# Patient Record
Sex: FEMALE | Race: WHITE | NOT HISPANIC OR LATINO | Employment: FULL TIME | URBAN - NONMETROPOLITAN AREA
[De-identification: names, ages, dates, MRNs, and addresses within clinical notes are randomized per-mention and may not be internally consistent; named-entity substitution may affect disease eponyms.]

---

## 2017-03-28 ENCOUNTER — OFFICE VISIT (OUTPATIENT)
Dept: ORTHOPEDIC SURGERY | Facility: CLINIC | Age: 62
End: 2017-03-28

## 2017-03-28 VITALS — WEIGHT: 179 LBS | HEIGHT: 66 IN | BODY MASS INDEX: 28.77 KG/M2

## 2017-03-28 DIAGNOSIS — Z87.81 HISTORY OF FRACTURE OF PATELLA: ICD-10-CM

## 2017-03-28 DIAGNOSIS — G89.29 CHRONIC PAIN OF LEFT KNEE: Primary | ICD-10-CM

## 2017-03-28 DIAGNOSIS — M25.562 CHRONIC PAIN OF LEFT KNEE: Primary | ICD-10-CM

## 2017-03-28 PROCEDURE — 99203 OFFICE O/P NEW LOW 30 MIN: CPT | Performed by: ORTHOPAEDIC SURGERY

## 2017-03-28 RX ORDER — LOVASTATIN 40 MG/1
40 TABLET ORAL NIGHTLY
COMMUNITY
Start: 2017-01-04 | End: 2018-01-05

## 2017-03-28 RX ORDER — TRAMADOL HYDROCHLORIDE 50 MG/1
TABLET ORAL
COMMUNITY
Start: 2017-01-27 | End: 2017-04-28

## 2017-03-28 RX ORDER — LEVOTHYROXINE SODIUM 88 UG/1
TABLET ORAL
COMMUNITY
Start: 2017-01-04 | End: 2017-11-01

## 2017-03-28 RX ORDER — ASPIRIN 81 MG/1
81 TABLET ORAL DAILY
COMMUNITY

## 2017-03-28 RX ORDER — FLUOXETINE HYDROCHLORIDE 40 MG/1
40 CAPSULE ORAL NIGHTLY
COMMUNITY
Start: 2017-01-04 | End: 2020-01-30 | Stop reason: SDUPTHER

## 2017-03-28 RX ORDER — MELOXICAM 15 MG/1
15 TABLET ORAL
COMMUNITY
Start: 2016-06-30 | End: 2020-01-09

## 2017-03-28 RX ORDER — FLUTICASONE PROPIONATE 50 MCG
2 SPRAY, SUSPENSION (ML) NASAL DAILY PRN
COMMUNITY
End: 2020-01-30 | Stop reason: SDUPTHER

## 2017-03-28 NOTE — PROGRESS NOTES
Neda Ignacio is a 62 y.o. female   Primary provider:  Prateek eBe MD       Chief Complaint   Patient presents with   • Left Knee - Establish Care, Pain   • Results     Patient brings xrays from        HISTORY OF PRESENT ILLNESS:    Pain   This is a chronic problem. The current episode started more than 1 year ago. The problem occurs constantly. The problem has been unchanged. Associated symptoms comments: Sharp pain with dull ache popping and grinding. The symptoms are aggravated by walking and standing (climbing stairs). She has tried NSAIDs and rest for the symptoms. The treatment provided mild relief.    she has been having a sharp pain with certain activities.  No specific injury.     CONCURRENT MEDICAL HISTORY:    Past Medical History:   Diagnosis Date   • Anxiety    • Arthritis    • Depression    • Tuberculosis        Allergies   Allergen Reactions   • Penicillins      stomach         Current Outpatient Prescriptions:   •  aspirin 81 MG EC tablet, Take 81 mg by mouth Daily., Disp: , Rfl:   •  FLUoxetine (PROzac) 40 MG capsule, Take 40 mg by mouth., Disp: , Rfl:   •  fluticasone (FLONASE) 50 MCG/ACT nasal spray, 2 sprays into each nostril Daily., Disp: , Rfl:   •  levothyroxine (SYNTHROID, LEVOTHROID) 88 MCG tablet, TAKE ONE TABLET BY MOUTH ONCE DAILY IN THE MORNING, Disp: , Rfl:   •  lovastatin (MEVACOR) 40 MG tablet, Take 40 mg by mouth., Disp: , Rfl:   •  meloxicam (MOBIC) 15 MG tablet, Take 15 mg by mouth., Disp: , Rfl:   •  traMADol (ULTRAM) 50 MG tablet, May take 1 to 2 tablets every 6 hours as needed for pain., Disp: , Rfl:     Past Surgical History:   Procedure Laterality Date   • HYSTERECTOMY         Family History   Problem Relation Age of Onset   • Heart disease Mother    • Arthritis Mother    • Heart disease Father    • Hypertension Father    • Arthritis Father    • Heart disease Sister    • Hypertension Sister    • Arthritis Sister    • Heart disease Brother    • Hypertension Brother    •  "Arthritis Brother        Social History     Social History   • Marital status:      Spouse name: N/A   • Number of children: N/A   • Years of education: N/A     Occupational History   • Not on file.     Social History Main Topics   • Smoking status: Never Smoker   • Smokeless tobacco: Never Used   • Alcohol use No   • Drug use: No   • Sexual activity: Not on file     Other Topics Concern   • Not on file     Social History Narrative   • No narrative on file        Review of Systems   Psychiatric/Behavioral: Positive for dysphoric mood. The patient is nervous/anxious.    All other systems reviewed and are negative.      PHYSICAL EXAMINATION:       Ht 66\" (167.6 cm)  Wt 179 lb (81.2 kg)  BMI 28.89 kg/m2    Physical Exam   Constitutional: She is oriented to person, place, and time. She appears well-developed and well-nourished.   Neurological: She is alert and oriented to person, place, and time.   Psychiatric: She has a normal mood and affect. Her behavior is normal. Judgment and thought content normal.       GAIT:     []  Normal  []  Antalgic    Assistive device: []  None  []  Walker     []  Crutches  []  Cane     []  Wheelchair  []  Stretcher    Right Knee Exam     Tenderness   The patient is experiencing no tenderness.         Range of Motion   The patient has normal right knee ROM.    Muscle Strength     The patient has normal right knee strength.    Tests   Drawer:       Anterior - negative      Varus: negative  Valgus: negative    Other   Erythema: absent  Sensation: normal  Pulse: present  Swelling: none      Left Knee Exam     Tenderness   Left knee tenderness location: Very tender on the lateral aspect at the level of the superior pole of the patella.    Range of Motion   The patient has normal left knee ROM.    Muscle Strength     The patient has normal left knee strength.    Tests   Drawer:       Anterior - negative       Varus: negative  Valgus: negative    Other   Erythema: absent  Sensation: " normal  Pulse: present  Swelling: none                    X-ray knee left 3 views1/25/2017  [facility]  Result Impression      Surgical change in the patella; small effusion.   Result Narrative   Indication:  Status post left knee surgery.    Left Knee, Three Views:  The knee is normally aligned and no fracture or acute abnormality.  There are surgical pins through the patella.  There is a wire around the patella that has two points of fracture that have developed since the July 2016 study.    The articular surface of the patella is smooth and well-preserved.  There is an effusion.           ASSESSMENT:    Diagnoses and all orders for this visit:    Chronic pain of left knee    History of fracture of patella    Other orders  -     FLUoxetine (PROzac) 40 MG capsule; Take 40 mg by mouth.  -     lovastatin (MEVACOR) 40 MG tablet; Take 40 mg by mouth.  -     traMADol (ULTRAM) 50 MG tablet; May take 1 to 2 tablets every 6 hours as needed for pain.  -     meloxicam (MOBIC) 15 MG tablet; Take 15 mg by mouth.  -     levothyroxine (SYNTHROID, LEVOTHROID) 88 MCG tablet; TAKE ONE TABLET BY MOUTH ONCE DAILY IN THE MORNING  -     aspirin 81 MG EC tablet; Take 81 mg by mouth Daily.  -     fluticasone (FLONASE) 50 MCG/ACT nasal spray; 2 sprays into each nostril Daily.          PLAN     We discussed proceeding with hardware removal of the left knee.  Patient has specific work requirements and does not think that she will be able to proceed with the full hardware removal until June or July.  We did discuss removal of hardware in the office with local anesthetic for the most prominent portion.  The patient was agreeable to that.  We will have her approved for that process in the office.    Return for For partial hardware removal..    Paul Biggs MD

## 2017-04-14 ENCOUNTER — TELEPHONE (OUTPATIENT)
Dept: ORTHOPEDIC SURGERY | Facility: CLINIC | Age: 62
End: 2017-04-14

## 2017-04-14 NOTE — TELEPHONE ENCOUNTER
Patient notified.     ----- Message from Paul Biggs MD sent at 4/13/2017  5:00 PM CDT -----  Really don't expect to much limitation after this.  It is a minimal procedure and hopefully she will be doing better than she is right now pretty quickly.    yassine  ----- Message -----     From: Ruba Randhawa MA     Sent: 4/12/2017  10:13 AM       To: Paul Biggs MD    She will have to have a  I know but i didn't know what other restrictions.   ----- Message -----     From: Jami Suarez     Sent: 4/12/2017  10:02 AM       To: [facility]    Dr. Biggs,    Patient is having a wire removed out of her left knee on 4/20/17 in the office. She wants to know if she will be ok to take care of her self and drive her self home afterwards.    Pt's # [phone]    -Jami

## 2017-04-20 ENCOUNTER — PROCEDURE VISIT (OUTPATIENT)
Dept: ORTHOPEDIC SURGERY | Facility: CLINIC | Age: 62
End: 2017-04-20

## 2017-04-20 VITALS — BODY MASS INDEX: 28.77 KG/M2 | WEIGHT: 179 LBS | HEIGHT: 66 IN

## 2017-04-20 DIAGNOSIS — Z87.81 HISTORY OF FRACTURE OF PATELLA: Primary | ICD-10-CM

## 2017-04-20 DIAGNOSIS — M25.562 CHRONIC PAIN OF LEFT KNEE: ICD-10-CM

## 2017-04-20 DIAGNOSIS — G89.29 CHRONIC PAIN OF LEFT KNEE: ICD-10-CM

## 2017-04-20 PROCEDURE — 20670 REMOVAL IMPLANT SUPERFICIAL: CPT | Performed by: ORTHOPAEDIC SURGERY

## 2017-04-20 NOTE — PROGRESS NOTES
"Neda Ignacio is a 62 y.o. female returns for     Chief Complaint   Patient presents with   • Left Knee - Follow-up   • Procedure     Hardware removal left knee       HISTORY OF PRESENT ILLNESS: Patient here for in office procedure. The patient voiced understanding of the risks, benefits, and alternative forms of treatment that were discussed and the patient consents to proceed with surgery.  All risks, benefits and alternatives were discussed.  Risks including to but not exclusive to anesthetic complications, including death, MI, CVA, infection, bleeding DVT, fracture, residual pain and need for future surgery.  This discussion was held with the patient by Paul Biggs MD and all questions were answered.         CONCURRENT MEDICAL HISTORY:    Past Medical History:   Diagnosis Date   • Anxiety    • Arthritis    • Depression    • Tuberculosis        Allergies   Allergen Reactions   • Penicillins      stomach         Current Outpatient Prescriptions:   •  aspirin 81 MG EC tablet, Take 81 mg by mouth Daily., Disp: , Rfl:   •  FLUoxetine (PROzac) 40 MG capsule, Take 40 mg by mouth., Disp: , Rfl:   •  fluticasone (FLONASE) 50 MCG/ACT nasal spray, 2 sprays into each nostril Daily., Disp: , Rfl:   •  levothyroxine (SYNTHROID, LEVOTHROID) 88 MCG tablet, TAKE ONE TABLET BY MOUTH ONCE DAILY IN THE MORNING, Disp: , Rfl:   •  lovastatin (MEVACOR) 40 MG tablet, Take 40 mg by mouth., Disp: , Rfl:   •  meloxicam (MOBIC) 15 MG tablet, Take 15 mg by mouth., Disp: , Rfl:   •  traMADol (ULTRAM) 50 MG tablet, May take 1 to 2 tablets every 6 hours as needed for pain., Disp: , Rfl:     Past Surgical History:   Procedure Laterality Date   • HYSTERECTOMY         ROS  No fevers or chills.  No chest pain or shortness of air.  No GI or  disturbances.    PHYSICAL EXAMINATION:       Ht 66\" (167.6 cm)  Wt 179 lb (81.2 kg)  BMI 28.89 kg/m2    Physical Exam    GAIT:     []  Normal  []  Antalgic    Assistive device: []  None  []  " Walker     []  Crutches  []  Cane     []  Wheelchair  []  Stretcher    Ortho Exam        Procedure:  The left knee was prepped and under aseptic conditions the lateral aspect of the left knee was injected with 2% lidocaine without epinephrine.  A small incision was made and dissection was carried down with a hemostat clamp.  The tip of the wire was palpable and was grasped with a needle .  The broken wire was removed with the wire knot without difficulty.  The wound was irrigated and then closed using interrupted nylon suture.  The wound was wrapped with 4 x 4's and an Ace bandage.          ASSESSMENT:    Diagnoses and all orders for this visit:    History of fracture of patella    Chronic pain of left knee          PLAN    Patient will keep the dressing in place for 24 hours and then can shower.  She will return in 10-14 days for suture removal.  Activity as tolerated.  No restrictions.    Return in about 10 days (around 4/30/2017) for Recheck.    Paul Biggs MD

## 2017-05-02 ENCOUNTER — OFFICE VISIT (OUTPATIENT)
Dept: ORTHOPEDIC SURGERY | Facility: CLINIC | Age: 62
End: 2017-05-02

## 2017-05-02 VITALS — HEIGHT: 66 IN | WEIGHT: 177 LBS | BODY MASS INDEX: 28.45 KG/M2

## 2017-05-02 DIAGNOSIS — M25.562 CHRONIC PAIN OF LEFT KNEE: ICD-10-CM

## 2017-05-02 DIAGNOSIS — G89.29 CHRONIC PAIN OF LEFT KNEE: ICD-10-CM

## 2017-05-02 DIAGNOSIS — Z87.81 HISTORY OF FRACTURE OF PATELLA: Primary | ICD-10-CM

## 2017-05-02 PROCEDURE — 99024 POSTOP FOLLOW-UP VISIT: CPT | Performed by: ORTHOPAEDIC SURGERY

## 2017-10-30 DIAGNOSIS — Z87.81 HISTORY OF FRACTURE OF PATELLA: Primary | ICD-10-CM

## 2017-10-31 ENCOUNTER — OFFICE VISIT (OUTPATIENT)
Dept: ORTHOPEDIC SURGERY | Facility: CLINIC | Age: 62
End: 2017-10-31

## 2017-10-31 VITALS — WEIGHT: 177 LBS | BODY MASS INDEX: 28.45 KG/M2 | HEIGHT: 66 IN

## 2017-10-31 DIAGNOSIS — M25.562 CHRONIC PAIN OF LEFT KNEE: ICD-10-CM

## 2017-10-31 DIAGNOSIS — G89.29 CHRONIC PAIN OF LEFT KNEE: ICD-10-CM

## 2017-10-31 DIAGNOSIS — Z87.81 HISTORY OF FRACTURE OF PATELLA: Primary | ICD-10-CM

## 2017-10-31 PROCEDURE — 99214 OFFICE O/P EST MOD 30 MIN: CPT | Performed by: ORTHOPAEDIC SURGERY

## 2017-10-31 RX ORDER — CLINDAMYCIN PHOSPHATE 600 MG/50ML
600 INJECTION INTRAVENOUS
Status: CANCELLED | OUTPATIENT
Start: 2017-11-06 | End: 2017-11-07

## 2017-10-31 NOTE — PROGRESS NOTES
"Neda Ignacio is a 62 y.o. female returns for     Chief Complaint   Patient presents with   • Left Knee - Follow-up       HISTORY OF PRESENT ILLNESS:   patient states wire started coming through about 5 days ago.   Having severe tenderness with pressure on the side of her knee.  No fevers or chills.  No new injury.     CONCURRENT MEDICAL HISTORY:    Past Medical History:   Diagnosis Date   • Anxiety    • Arthritis    • Depression    • Tuberculosis        Allergies   Allergen Reactions   • Penicillins      stomach         Current Outpatient Prescriptions:   •  aspirin 81 MG EC tablet, Take 81 mg by mouth Daily., Disp: , Rfl:   •  FLUoxetine (PROzac) 40 MG capsule, Take 40 mg by mouth., Disp: , Rfl:   •  levothyroxine (SYNTHROID, LEVOTHROID) 88 MCG tablet, TAKE ONE TABLET BY MOUTH ONCE DAILY IN THE MORNING, Disp: , Rfl:   •  lovastatin (MEVACOR) 40 MG tablet, Take 40 mg by mouth., Disp: , Rfl:   •  meloxicam (MOBIC) 15 MG tablet, Take 15 mg by mouth., Disp: , Rfl:   •  fluticasone (FLONASE) 50 MCG/ACT nasal spray, 2 sprays into each nostril Daily., Disp: , Rfl:     Past Surgical History:   Procedure Laterality Date   • HYSTERECTOMY         ROS  No fevers or chills.  No chest pain or shortness of air.  No GI or  disturbances.    PHYSICAL EXAMINATION:       Ht 66\" (167.6 cm)  Wt 177 lb (80.3 kg)  BMI 28.57 kg/m2    Physical Exam   Constitutional: She is oriented to person, place, and time. She appears well-developed and well-nourished. No distress.   Cardiovascular: Normal rate and regular rhythm.    Pulmonary/Chest: Effort normal and breath sounds normal.   Abdominal: Soft. Bowel sounds are normal.   Musculoskeletal:        Left knee: She exhibits no effusion.   Neurological: She is alert and oriented to person, place, and time.   Psychiatric: She has a normal mood and affect. Her behavior is normal. Judgment and thought content normal.   Vitals reviewed.      GAIT:     []  Normal  [x]  Antalgic    Assistive " device: [x]  None  []  Walker     []  Crutches  []  Cane     []  Wheelchair  []  Stretcher    Left Knee Exam     Tenderness   Left knee tenderness location: Extreme tenderness along the medial aspect of the knee with swelling and prominence on the medial side consistent with the presence of the underlying wire.    Range of Motion   The patient has normal left knee ROM.    Muscle Strength     The patient has normal left knee strength.    Tests   Drawer:       Anterior - negative       Varus: negative  Valgus: negative    Other   Erythema: absent  Scars: present  Sensation: normal  Pulse: present  Swelling: none  Effusion: no effusion present              Xr Knee 1 Or 2 View Left    Result Date: 10/31/2017  Narrative: AP and lateral of the left knee shows acceptable position and alignment of a patella fracture status post ORIF.  She has 2 cannulated screws from distal to proximal.  She also has a cerclage wire that is pointed medially with a soft tissue indentation noted on x-ray.  No other acute bony abnormalities are noted.  Mild arthritic change noted in the lateral compartment of the knee. 10/31/17 at 1:59 PM by Paul Biggs MD              ASSESSMENT:    Diagnoses and all orders for this visit:    History of fracture of patella  -     Case Request; Standing  -     clindamycin (CLEOCIN) 600 mg in dextrose (D5W) 5 % 100 mL IVPB; Infuse 600 mg into a venous catheter On Call to the Operating Room.  -     Case Request    Chronic pain of left knee  -     Case Request; Standing  -     clindamycin (CLEOCIN) 600 mg in dextrose (D5W) 5 % 100 mL IVPB; Infuse 600 mg into a venous catheter On Call to the Operating Room.  -     Case Request    Other orders  -     Obtain Informed Consent; Standing          PLAN    The patient voiced understanding of the risks, benefits, and alternative forms of treatment that were discussed and the patient consents to proceed with surgery.  All risks, benefits and alternatives were  discussed.  Risks including to but not exclusive to anesthetic complications, including death, MI, CVA, infection, bleeding DVT, fracture, residual pain and need for future surgery.  This discussion was held with the patient by Paul Biggs MD and all questions were answered.    Plan hardware removal:  2 screws and 2 wires.    Plan to be done in the operating room ASAP    Return for Post-operative eval.    Paul Biggs MD

## 2017-11-01 RX ORDER — RANITIDINE 150 MG/1
150 TABLET ORAL NIGHTLY
COMMUNITY
End: 2020-01-30 | Stop reason: SDUPTHER

## 2017-11-01 RX ORDER — LEVOTHYROXINE SODIUM 88 UG/1
88 TABLET ORAL DAILY
COMMUNITY
End: 2018-03-15 | Stop reason: SDUPTHER

## 2017-11-01 RX ORDER — DICYCLOMINE HCL 20 MG
20 TABLET ORAL EVERY 6 HOURS
COMMUNITY
End: 2020-01-30 | Stop reason: SDUPTHER

## 2017-11-03 ENCOUNTER — ANESTHESIA EVENT (OUTPATIENT)
Dept: PERIOP | Facility: HOSPITAL | Age: 62
End: 2017-11-03

## 2017-11-06 ENCOUNTER — APPOINTMENT (OUTPATIENT)
Dept: GENERAL RADIOLOGY | Facility: HOSPITAL | Age: 62
End: 2017-11-06

## 2017-11-06 ENCOUNTER — HOSPITAL ENCOUNTER (OUTPATIENT)
Facility: HOSPITAL | Age: 62
Setting detail: HOSPITAL OUTPATIENT SURGERY
Discharge: HOME | End: 2017-11-06
Attending: ORTHOPAEDIC SURGERY | Admitting: ORTHOPAEDIC SURGERY

## 2017-11-06 ENCOUNTER — ANESTHESIA (OUTPATIENT)
Dept: PERIOP | Facility: HOSPITAL | Age: 62
End: 2017-11-06

## 2017-11-06 VITALS
TEMPERATURE: 97.2 F | BODY MASS INDEX: 28.31 KG/M2 | OXYGEN SATURATION: 96 % | HEIGHT: 66 IN | DIASTOLIC BLOOD PRESSURE: 67 MMHG | WEIGHT: 176.15 LBS | SYSTOLIC BLOOD PRESSURE: 119 MMHG | HEART RATE: 77 BPM | RESPIRATION RATE: 18 BRPM

## 2017-11-06 DIAGNOSIS — M25.562 CHRONIC PAIN OF LEFT KNEE: ICD-10-CM

## 2017-11-06 DIAGNOSIS — G89.29 CHRONIC PAIN OF LEFT KNEE: ICD-10-CM

## 2017-11-06 DIAGNOSIS — Z87.81 HISTORY OF FRACTURE OF PATELLA: ICD-10-CM

## 2017-11-06 PROCEDURE — 20680 REMOVAL OF IMPLANT DEEP: CPT | Performed by: ORTHOPAEDIC SURGERY

## 2017-11-06 PROCEDURE — 73560 X-RAY EXAM OF KNEE 1 OR 2: CPT | Performed by: ORTHOPAEDIC SURGERY

## 2017-11-06 PROCEDURE — 25010000002 FENTANYL CITRATE (PF) 100 MCG/2ML SOLUTION: Performed by: NURSE ANESTHETIST, CERTIFIED REGISTERED

## 2017-11-06 PROCEDURE — 25010000002 MIDAZOLAM PER 1 MG: Performed by: NURSE ANESTHETIST, CERTIFIED REGISTERED

## 2017-11-06 PROCEDURE — 88300 SURGICAL PATH GROSS: CPT | Performed by: PATHOLOGY

## 2017-11-06 PROCEDURE — 76000 FLUOROSCOPY <1 HR PHYS/QHP: CPT

## 2017-11-06 PROCEDURE — 88300 SURGICAL PATH GROSS: CPT | Performed by: ORTHOPAEDIC SURGERY

## 2017-11-06 PROCEDURE — 25010000002 DEXAMETHASONE PER 1 MG: Performed by: NURSE ANESTHETIST, CERTIFIED REGISTERED

## 2017-11-06 PROCEDURE — 25010000002 ONDANSETRON PER 1 MG: Performed by: NURSE ANESTHETIST, CERTIFIED REGISTERED

## 2017-11-06 PROCEDURE — 25010000002 PROPOFOL 10 MG/ML EMULSION: Performed by: NURSE ANESTHETIST, CERTIFIED REGISTERED

## 2017-11-06 RX ORDER — EPHEDRINE SULFATE 50 MG/ML
5 INJECTION, SOLUTION INTRAVENOUS ONCE AS NEEDED
Status: DISCONTINUED | OUTPATIENT
Start: 2017-11-06 | End: 2017-11-06 | Stop reason: HOSPADM

## 2017-11-06 RX ORDER — DEXAMETHASONE SODIUM PHOSPHATE 4 MG/ML
INJECTION, SOLUTION INTRA-ARTICULAR; INTRALESIONAL; INTRAMUSCULAR; INTRAVENOUS; SOFT TISSUE AS NEEDED
Status: DISCONTINUED | OUTPATIENT
Start: 2017-11-06 | End: 2017-11-06 | Stop reason: SURG

## 2017-11-06 RX ORDER — FENTANYL CITRATE 50 UG/ML
INJECTION, SOLUTION INTRAMUSCULAR; INTRAVENOUS AS NEEDED
Status: DISCONTINUED | OUTPATIENT
Start: 2017-11-06 | End: 2017-11-06 | Stop reason: SURG

## 2017-11-06 RX ORDER — LABETALOL HYDROCHLORIDE 5 MG/ML
5 INJECTION, SOLUTION INTRAVENOUS
Status: DISCONTINUED | OUTPATIENT
Start: 2017-11-06 | End: 2017-11-06 | Stop reason: HOSPADM

## 2017-11-06 RX ORDER — ONDANSETRON 2 MG/ML
4 INJECTION INTRAMUSCULAR; INTRAVENOUS ONCE AS NEEDED
Status: DISCONTINUED | OUTPATIENT
Start: 2017-11-06 | End: 2017-11-06 | Stop reason: HOSPADM

## 2017-11-06 RX ORDER — FLUMAZENIL 0.1 MG/ML
0.2 INJECTION INTRAVENOUS AS NEEDED
Status: DISCONTINUED | OUTPATIENT
Start: 2017-11-06 | End: 2017-11-06 | Stop reason: HOSPADM

## 2017-11-06 RX ORDER — SODIUM CHLORIDE, SODIUM GLUCONATE, SODIUM ACETATE, POTASSIUM CHLORIDE, AND MAGNESIUM CHLORIDE 526; 502; 368; 37; 30 MG/100ML; MG/100ML; MG/100ML; MG/100ML; MG/100ML
1000 INJECTION, SOLUTION INTRAVENOUS CONTINUOUS PRN
Status: DISCONTINUED | OUTPATIENT
Start: 2017-11-06 | End: 2017-11-06 | Stop reason: HOSPADM

## 2017-11-06 RX ORDER — MIDAZOLAM HYDROCHLORIDE 1 MG/ML
INJECTION INTRAMUSCULAR; INTRAVENOUS AS NEEDED
Status: DISCONTINUED | OUTPATIENT
Start: 2017-11-06 | End: 2017-11-06 | Stop reason: SURG

## 2017-11-06 RX ORDER — HYDROCODONE BITARTRATE AND ACETAMINOPHEN 7.5; 325 MG/1; MG/1
1 TABLET ORAL EVERY 4 HOURS PRN
Qty: 20 TABLET | Refills: 0 | Status: SHIPPED | OUTPATIENT
Start: 2017-11-06 | End: 2017-12-19

## 2017-11-06 RX ORDER — EPHEDRINE SULFATE 50 MG/ML
INJECTION, SOLUTION INTRAVENOUS AS NEEDED
Status: DISCONTINUED | OUTPATIENT
Start: 2017-11-06 | End: 2017-11-06 | Stop reason: SURG

## 2017-11-06 RX ORDER — LIDOCAINE HYDROCHLORIDE 20 MG/ML
INJECTION, SOLUTION INFILTRATION; PERINEURAL AS NEEDED
Status: DISCONTINUED | OUTPATIENT
Start: 2017-11-06 | End: 2017-11-06 | Stop reason: SURG

## 2017-11-06 RX ORDER — NALOXONE HCL 0.4 MG/ML
0.2 VIAL (ML) INJECTION AS NEEDED
Status: DISCONTINUED | OUTPATIENT
Start: 2017-11-06 | End: 2017-11-06 | Stop reason: HOSPADM

## 2017-11-06 RX ORDER — CLINDAMYCIN PHOSPHATE 600 MG/50ML
600 INJECTION INTRAVENOUS
Status: COMPLETED | OUTPATIENT
Start: 2017-11-07 | End: 2017-11-06

## 2017-11-06 RX ORDER — ONDANSETRON 2 MG/ML
INJECTION INTRAMUSCULAR; INTRAVENOUS AS NEEDED
Status: DISCONTINUED | OUTPATIENT
Start: 2017-11-06 | End: 2017-11-06 | Stop reason: SURG

## 2017-11-06 RX ORDER — PROMETHAZINE HYDROCHLORIDE 12.5 MG/1
12.5 TABLET ORAL ONCE AS NEEDED
Status: DISCONTINUED | OUTPATIENT
Start: 2017-11-06 | End: 2017-11-06 | Stop reason: HOSPADM

## 2017-11-06 RX ORDER — PROPOFOL 10 MG/ML
VIAL (ML) INTRAVENOUS AS NEEDED
Status: DISCONTINUED | OUTPATIENT
Start: 2017-11-06 | End: 2017-11-06 | Stop reason: SURG

## 2017-11-06 RX ORDER — BACITRACIN 50000 [IU]/1
INJECTION, POWDER, FOR SOLUTION INTRAMUSCULAR AS NEEDED
Status: DISCONTINUED | OUTPATIENT
Start: 2017-11-06 | End: 2017-11-06 | Stop reason: HOSPADM

## 2017-11-06 RX ORDER — ONDANSETRON 4 MG/1
4 TABLET, FILM COATED ORAL ONCE AS NEEDED
Status: DISCONTINUED | OUTPATIENT
Start: 2017-11-06 | End: 2017-11-06 | Stop reason: HOSPADM

## 2017-11-06 RX ORDER — DIPHENHYDRAMINE HYDROCHLORIDE 50 MG/ML
12.5 INJECTION INTRAMUSCULAR; INTRAVENOUS
Status: DISCONTINUED | OUTPATIENT
Start: 2017-11-06 | End: 2017-11-06 | Stop reason: HOSPADM

## 2017-11-06 RX ORDER — HYDROCODONE BITARTRATE AND ACETAMINOPHEN 7.5; 325 MG/1; MG/1
1 TABLET ORAL ONCE AS NEEDED
Status: DISCONTINUED | OUTPATIENT
Start: 2017-11-06 | End: 2017-11-06 | Stop reason: HOSPADM

## 2017-11-06 RX ADMIN — SODIUM CHLORIDE, SODIUM GLUCONATE, SODIUM ACETATE, POTASSIUM CHLORIDE, AND MAGNESIUM CHLORIDE: 526; 502; 368; 37; 30 INJECTION, SOLUTION INTRAVENOUS at 16:43

## 2017-11-06 RX ADMIN — FENTANYL CITRATE 25 MCG: 50 INJECTION, SOLUTION INTRAMUSCULAR; INTRAVENOUS at 17:00

## 2017-11-06 RX ADMIN — MIDAZOLAM 2 MG: 1 INJECTION INTRAMUSCULAR; INTRAVENOUS at 16:43

## 2017-11-06 RX ADMIN — SODIUM CHLORIDE, SODIUM GLUCONATE, SODIUM ACETATE, POTASSIUM CHLORIDE, AND MAGNESIUM CHLORIDE 1000 ML: 526; 502; 368; 37; 30 INJECTION, SOLUTION INTRAVENOUS at 13:41

## 2017-11-06 RX ADMIN — CLINDAMYCIN IN 5 PERCENT DEXTROSE 600 MG: 12 INJECTION, SOLUTION INTRAVENOUS at 16:55

## 2017-11-06 RX ADMIN — ONDANSETRON 4 MG: 2 INJECTION INTRAMUSCULAR; INTRAVENOUS at 17:35

## 2017-11-06 RX ADMIN — EPHEDRINE SULFATE 10 MG: 50 INJECTION INTRAMUSCULAR; INTRAVENOUS; SUBCUTANEOUS at 17:10

## 2017-11-06 RX ADMIN — FENTANYL CITRATE 25 MCG: 50 INJECTION, SOLUTION INTRAMUSCULAR; INTRAVENOUS at 17:35

## 2017-11-06 RX ADMIN — PROPOFOL 150 MG: 10 INJECTION, EMULSION INTRAVENOUS at 16:52

## 2017-11-06 RX ADMIN — DEXAMETHASONE SODIUM PHOSPHATE 4 MG: 4 INJECTION, SOLUTION INTRAMUSCULAR; INTRAVENOUS at 17:35

## 2017-11-06 RX ADMIN — LIDOCAINE HYDROCHLORIDE 50 MG: 20 INJECTION, SOLUTION INFILTRATION; PERINEURAL at 16:52

## 2017-11-06 NOTE — ANESTHESIA PREPROCEDURE EVALUATION
Anesthesia Evaluation     NPO Solid Status: > 8 hours  NPO Liquid Status: > 8 hours     Airway   Mallampati: II  TM distance: >3 FB  Neck ROM: full  no difficulty expected  Dental    (+) poor dentition    Pulmonary     breath sounds clear to auscultation  (+) a smoker Former,   Cardiovascular     ECG reviewed  Rhythm: regular  Rate: normal        Neuro/Psych  (+) psychiatric history Depression,    GI/Hepatic/Renal/Endo    (+)  GERD well controlled, hypothyroidism,     Musculoskeletal     Abdominal     Abdomen: soft.   Substance History      OB/GYN          Other   (+) arthritis                                     Anesthesia Plan    ASA 2     general     intravenous induction   Anesthetic plan and risks discussed with patient.

## 2017-11-06 NOTE — OP NOTE
HARDWARE REMOVAL  Procedure Note    Name:    Neda Ignacio  YOB: 1955  Date of surgery:   11/6/2017    Pre-op Diagnosis:   History of fracture of patella [Z87.81]  Chronic pain of left knee [M25.562, G89.29]    Post-op Diagnosis:    Post-Op Diagnosis Codes:     * History of fracture of patella [Z87.81]     * Chronic pain of left knee [M25.562, G89.29]    Procedure:  Procedure(s):  HARDWARE REMOVAL LEFT KNEE                                                  (4-0 cannulated screws two and two free pieces of cerclage wire)    Surgeon:  Surgeon(s):  Paul Biggs MD    ASSISTANT:  Shirley Cook CSA    Anesthesia: General    Staff:   Circulator: Danita Mora RN; Alena Landa RN  Scrub Person: Liza Tsai V  Assistant: Shirley Cook CSA    Estimated Blood Loss: minimal    Specimens:                  ID Type Source Tests Collected by Time Destination   A :  Hardware / Foreign Body Knee, Left TISSUE EXAM Paul Biggs MD 11/6/2017 1712          Drains:           Findings:  As below    Complications: None    IMPLANTS:   Nothing was implanted during the procedure      PROCEDURE:  Once consent was obtained the patient was taken to the operating room and once adequate anesthesia was obtained in the left lower extremity was prepped and draped in the standard surgical fashion.  A surgical timeout was then performed and then the tourniquet was inflated to 300 mmHg.  A small incision was made overlying the prominence of the medial aspect of her knee and mild dissection was carried down to expose the wire that was prominent on the medial aspect of the knee.  An incision was then made on the inferior portion of the previous incision dissection was carried down to the extensor mechanism.  A mild amount of dissection was carried medially along the retinaculum until the other free piece of wire was encountered.  This was exposed and removed without difficulty.  Using C-arm imaging for  localization within the screw heads of the 2 screws that were going through the patella were identified and then engaged with the screwdriver and removed without difficulty.  2 longitudinal incisions were made in the patellar tendon in order to access the cannulated screw heads.  Once the screws were removed, the C-arm imaging was utilized to confirm that all hardware had been removed from the knee.  The wounds were then copiously irrigated bacitracin saline, the tourniquet was let down, hemostasis was obtained.  The wound was then closed using 2-0 Vicryl in the longitudinal splits of the tendon, 2-0 Vicryl subcutaneously, and 3-0 nylon for the skin.  The small medial wound was closed with nylon suture as well.  The wound was then covered with Xeroform gauze 4 x 4's and a bulky soft dressing was applied.  Patient was then awakened and taken to the recovery room in good condition, she tolerated the procedure very well.      Paul Biggs MD     Date: 11/6/2017  Time: 5:54 PM

## 2017-11-06 NOTE — ANESTHESIA POSTPROCEDURE EVALUATION
Patient: Neda Ignacio    Procedure Summary     Date Anesthesia Start Anesthesia Stop Room / Location    11/06/17 [id number]  [facility] OR 11 / [facility]       Procedure Diagnosis Surgeon Provider    HARDWARE REMOVAL LEFT KNEE                                                  (4-0 cannulated screws two and two free pieces of cerclage wire) (Left ) History of fracture of patella; Chronic pain of left knee  (History of fracture of patella [Z87.81]; Chronic pain of left knee [M25.562, G89.29]) MD Brock Morel MD          Anesthesia Type: general  Last vitals  BP   143/77 (11/06/17 1334)   Temp   97.5 °F (36.4 °C) (11/06/17 1334)   Pulse   65 (11/06/17 1334)   Resp   18 (11/06/17 1334)     SpO2   95 % (11/06/17 1334)     Post Anesthesia Care and Evaluation    Patient location during evaluation: PACU  Patient participation: complete - patient participated  Level of consciousness: sleepy but conscious  Pain management: adequate  Airway patency: patent  Anesthetic complications: No anesthetic complications    Cardiovascular status: acceptable  Respiratory status: acceptable  Hydration status: acceptable

## 2017-11-06 NOTE — ANESTHESIA PROCEDURE NOTES
Airway  Urgency: elective      General Information and Staff    Patient location during procedure: OR  CRNA: SHAWNA KEN    Indications and Patient Condition  Indications for airway management: airway protection    Preoxygenated: yes      Final Airway Details  Final airway type: supraglottic airway      Successful airway: classic  Size 4    Number of attempts at approach: 1

## 2017-11-07 LAB
LAB AP CASE REPORT: NORMAL
Lab: NORMAL
PATH REPORT.FINAL DX SPEC: NORMAL
PATH REPORT.GROSS SPEC: NORMAL

## 2017-11-07 NOTE — PLAN OF CARE
Problem: Patient Care Overview (Adult)  Goal: Plan of Care Review  Outcome: Ongoing (interventions implemented as appropriate)    11/06/17 6573   Coping/Psychosocial Response Interventions   Plan Of Care Reviewed With patient   Patient Care Overview   Progress improving   Outcome Evaluation   Outcome Summary/Follow up Plan vss, dc per anesthesia protocol, transfer to [facility].       Goal: Discharge Needs Assessment  Outcome: Ongoing (interventions implemented as appropriate)

## 2017-11-07 NOTE — DISCHARGE INSTR - APPOINTMENTS
CALL TOMORROW AND MAKE FOLLOW UP APPOINTMENT WITH DR GAYTAN, TORIE PARTT, OR MELODY HURST FOR 10-14 DAYS.

## 2017-11-07 NOTE — PLAN OF CARE
Problem: Patient Care Overview (Adult)  Goal: Plan of Care Review  Outcome: Outcome(s) achieved Date Met:  11/06/17  Goal: Adult Individualization and Mutuality  Outcome: Outcome(s) achieved Date Met:  11/06/17  Goal: Discharge Needs Assessment  Outcome: Outcome(s) achieved Date Met:  11/06/17    Problem: Perioperative Period (Adult)  Goal: Signs and Symptoms of Listed Potential Problems Will be Absent or Manageable (Perioperative Period)  Outcome: Outcome(s) achieved Date Met:  11/06/17

## 2017-11-21 ENCOUNTER — OFFICE VISIT (OUTPATIENT)
Dept: ORTHOPEDIC SURGERY | Facility: CLINIC | Age: 62
End: 2017-11-21

## 2017-11-21 VITALS — HEIGHT: 66 IN | WEIGHT: 176.4 LBS | BODY MASS INDEX: 28.35 KG/M2

## 2017-11-21 DIAGNOSIS — Z87.81 HISTORY OF FRACTURE OF PATELLA: ICD-10-CM

## 2017-11-21 DIAGNOSIS — M25.562 CHRONIC PAIN OF LEFT KNEE: Primary | ICD-10-CM

## 2017-11-21 DIAGNOSIS — G89.29 CHRONIC PAIN OF LEFT KNEE: Primary | ICD-10-CM

## 2017-11-21 PROCEDURE — 99024 POSTOP FOLLOW-UP VISIT: CPT | Performed by: NURSE PRACTITIONER

## 2017-11-21 NOTE — PROGRESS NOTES
Neda Ignacio is a 62 y.o. female is s/p       Chief Complaint   Patient presents with   • Left Knee - Post-op       HISTORY OF PRESENT ILLNESS: Patient presents to office for postop follow-up after having hardware (4-0 cannulated screws x2 and two free pieces of cerclage wire)  removed from the left knee on 11/6/2017 per Dr. Biggs.  Patient is doing well with no complaints or concerns noted.  Sutures are removed today and Steri-Strips applied.      Allergies   Allergen Reactions   • Penicillins GI Intolerance         Current Outpatient Prescriptions:   •  aspirin 81 MG EC tablet, Take 81 mg by mouth Daily., Disp: , Rfl:   •  dicyclomine (BENTYL) 20 MG tablet, Take 20 mg by mouth Every 6 (Six) Hours., Disp: , Rfl:   •  FLUoxetine (PROzac) 40 MG capsule, Take 40 mg by mouth Every Night., Disp: , Rfl:   •  fluticasone (FLONASE) 50 MCG/ACT nasal spray, 2 sprays into each nostril Daily As Needed., Disp: , Rfl:   •  HYDROcodone-acetaminophen (NORCO) 7.5-325 MG per tablet, Take 1 tablet by mouth Every 4 (Four) Hours As Needed for Moderate Pain  (Pain)., Disp: 20 tablet, Rfl: 0  •  levothyroxine (SYNTHROID, LEVOTHROID) 88 MCG tablet, Take 88 mcg by mouth Daily., Disp: , Rfl:   •  lovastatin (MEVACOR) 40 MG tablet, Take 40 mg by mouth Every Night., Disp: , Rfl:   •  meloxicam (MOBIC) 15 MG tablet, Take 15 mg by mouth., Disp: , Rfl:   •  raNITIdine (ZANTAC) 150 MG tablet, Take 150 mg by mouth Every Night., Disp: , Rfl:     No fevers or chills.  No nausea or vomiting.      PHYSICAL EXAMINATION:       Neda Ignacio is a 62 y.o. female    Patient is awake and alert, answers questions appropriately and is in no apparent distress.    GAIT:     [x]  Normal  []  Antalgic    Assistive device: [x]  None  []  Walker     []  Crutches  []  Cane     []  Wheelchair  []  Stretcher    Left Knee Exam     Tenderness   Left knee tenderness location: mild incisional tenderness anterior knee.    Range of Motion   The patient has normal left  knee ROM.    Muscle Strength     The patient has normal left knee strength.    Other   Erythema: absent  Sensation: normal  Pulse: present  Swelling: mild  Effusion: no effusion present    Comments:  Healing surgical incision to anterior knee that is well-approximated. No erythema or drainage noted. No signs of infection. Mild, localized swelling at the incision site.               ASSESSMENT:    Diagnoses and all orders for this visit:    Chronic pain of left knee    History of fracture of patella      PLAN    Incision to left knee is healing well postoperatively.  Sutures are removed today and Steri-Strips placed.  Patient is doing well and has no complaints or concerns.  Continue to monitor the wound for any signs of infection including increased redness, increased swelling, increased tenderness and/or purulent drainage.  Notify office if any of these signs are noted.  Follow-up in 4 weeks for recheck with Dr. Biggs.    Return in about 4 weeks (around 12/19/2017) for Recheck with Dr. Biggs.      This document has been electronically signed by BRITTANY Wheeler on November 21, 2017 5:11 PM      BRITTANY Wheeler

## 2017-12-19 ENCOUNTER — OFFICE VISIT (OUTPATIENT)
Dept: ORTHOPEDIC SURGERY | Facility: CLINIC | Age: 62
End: 2017-12-19

## 2017-12-19 DIAGNOSIS — M25.562 CHRONIC PAIN OF LEFT KNEE: Primary | ICD-10-CM

## 2017-12-19 DIAGNOSIS — Z87.81 HISTORY OF FRACTURE OF PATELLA: ICD-10-CM

## 2017-12-19 DIAGNOSIS — G89.29 CHRONIC PAIN OF LEFT KNEE: Primary | ICD-10-CM

## 2017-12-19 PROCEDURE — 99024 POSTOP FOLLOW-UP VISIT: CPT | Performed by: ORTHOPAEDIC SURGERY

## 2017-12-19 NOTE — PROGRESS NOTES
Neda Ignacio is a 62 y.o. female returns for     Chief Complaint   Patient presents with   • Left Knee - Follow-up       HISTORY OF PRESENT ILLNESS: HARDWARE REMOVAL LEFT KNEE done on 11/6/2017. Patient states that she is up on her feet all day she has pain and swelling.   But, she is doing better  Pain from before surgery is gone  No fevers or chills       CONCURRENT MEDICAL HISTORY:    Past Medical History:   Diagnosis Date   • Acid reflux    • Anxiety    • Arthritis    • Depression    • Disease of thyroid gland    • Tuberculosis        Allergies   Allergen Reactions   • Penicillins GI Intolerance         Current Outpatient Prescriptions:   •  aspirin 81 MG EC tablet, Take 81 mg by mouth Daily., Disp: , Rfl:   •  dicyclomine (BENTYL) 20 MG tablet, Take 20 mg by mouth Every 6 (Six) Hours., Disp: , Rfl:   •  FLUoxetine (PROzac) 40 MG capsule, Take 40 mg by mouth Every Night., Disp: , Rfl:   •  fluticasone (FLONASE) 50 MCG/ACT nasal spray, 2 sprays into each nostril Daily As Needed., Disp: , Rfl:   •  levothyroxine (SYNTHROID, LEVOTHROID) 88 MCG tablet, Take 88 mcg by mouth Daily., Disp: , Rfl:   •  lovastatin (MEVACOR) 40 MG tablet, Take 40 mg by mouth Every Night., Disp: , Rfl:   •  meloxicam (MOBIC) 15 MG tablet, Take 15 mg by mouth., Disp: , Rfl:   •  raNITIdine (ZANTAC) 150 MG tablet, Take 150 mg by mouth Every Night., Disp: , Rfl:     Past Surgical History:   Procedure Laterality Date   • HARDWARE REMOVAL Left 11/6/2017    Procedure: HARDWARE REMOVAL LEFT KNEE                                                  (4-0 cannulated screws two and two free pieces of cerclage wire);  Surgeon: Paul Biggs MD;  Location: [facility];  Service:    • HYSTERECTOMY     • KNEE SURGERY Left     hardware placed secondary to trauma       ROS  No fevers or chills.  No chest pain or shortness of air.  No GI or  disturbances.    PHYSICAL EXAMINATION:       There were no vitals taken for this visit.    Physical Exam    Constitutional: She is oriented to person, place, and time. She appears well-developed and well-nourished.   Musculoskeletal:        Right knee: She exhibits no effusion.   Neurological: She is alert and oriented to person, place, and time.   Psychiatric: She has a normal mood and affect. Her behavior is normal. Judgment and thought content normal.       GAIT:     [x]  Normal  []  Antalgic    Assistive device: [x]  None  []  Walker     []  Crutches  []  Cane     []  Wheelchair  []  Stretcher    Right Knee Exam     Tenderness   The patient is experiencing no tenderness.         Range of Motion   The patient has normal right knee ROM.    Tests   Varus: negative  Valgus: negative    Other   Erythema: absent  Scars: present  Swelling: mild  Other tests: no effusion present                      ASSESSMENT:    Diagnoses and all orders for this visit:    Chronic pain of left knee    History of fracture of patella          PLAN    Activity as tolerated  No restrictions  Continue to work on strength and conditioning  F/u as needed.    Return if symptoms worsen or fail to improve, for recheck.    Paul Biggs MD

## 2018-03-12 RX ORDER — DICYCLOMINE HCL 20 MG
20 TABLET ORAL EVERY 6 HOURS PRN
Qty: 30 TABLET | Refills: 3 | Status: CANCELLED | OUTPATIENT
Start: 2018-03-12

## 2018-03-13 RX ORDER — DICYCLOMINE HCL 20 MG
20 TABLET ORAL EVERY 6 HOURS PRN
Qty: 30 TABLET | Refills: 3 | Status: CANCELLED | OUTPATIENT
Start: 2018-03-12

## 2018-03-20 ENCOUNTER — OFFICE VISIT (OUTPATIENT)
Dept: PODIATRY | Facility: CLINIC | Age: 63
End: 2018-03-20

## 2018-03-20 VITALS — HEIGHT: 66 IN | OXYGEN SATURATION: 99 % | WEIGHT: 177.91 LBS | HEART RATE: 80 BPM | BODY MASS INDEX: 28.59 KG/M2

## 2018-03-20 DIAGNOSIS — S82.832A CLOSED FRACTURE OF DISTAL END OF LEFT FIBULA, UNSPECIFIED FRACTURE MORPHOLOGY, INITIAL ENCOUNTER: Primary | ICD-10-CM

## 2018-03-20 PROCEDURE — 27786 TREATMENT OF ANKLE FRACTURE: CPT | Performed by: PODIATRIST

## 2018-03-20 NOTE — PROGRESS NOTES
"Neda Ignacio  1955  63 y.o. female  PCP- Dr. Bee  Patient said on 03/15/2018 she \"rolled\" her ankle and did not catch herself. She was seen at Urgent Care on 03/15/2018 where x-rays where taken.  03/20/2018    Chief Complaint   Patient presents with   • Left Ankle - Ankle Injury           History of Present Illness    Neda Ignacio is a 63 y.o.female who presents to clinic today with chief complaint of left ankle injury.  States that on March 15, 2018 she rolled her left ankle and injured it.  Her ankle immediately swelled and she had difficulty bearing weight.  She went to the urgent care where x-rays were taken.  She was diagnosed with a ankle fracture placed into a splint and told to be nonweightbearing with crutches.  She presents today for follow-up.  She has been nonweightbearing to the left  lower extremity.  Rates her pain today as a 1 out of 10.  She has no other pedal complaints.        Past Medical History:   Diagnosis Date   • Acid reflux    • Anxiety    • Arthritis    • Depression    • Disease of thyroid gland    • Tuberculosis          Past Surgical History:   Procedure Laterality Date   • HARDWARE REMOVAL Left 11/6/2017    Procedure: HARDWARE REMOVAL LEFT KNEE                                                  (4-0 cannulated screws two and two free pieces of cerclage wire);  Surgeon: Paul Biggs MD;  Location: [facility];  Service:    • HYSTERECTOMY     • KNEE SURGERY Left     hardware placed secondary to trauma         Family History   Problem Relation Age of Onset   • Heart disease Mother    • Arthritis Mother    • Heart disease Father    • Hypertension Father    • Arthritis Father    • Heart disease Sister    • Hypertension Sister    • Arthritis Sister    • Heart disease Brother    • Hypertension Brother    • Arthritis Brother    • No Known Problems Maternal Grandmother    • No Known Problems Maternal Grandfather    • No Known Problems Paternal Grandmother    • No Known " "Problems Paternal Grandfather    • Autism Son    • No Known Problems Daughter        Allergies   Allergen Reactions   • Penicillins GI Intolerance       Social History     Social History   • Marital status:      Spouse name: N/A   • Number of children: N/A   • Years of education: N/A     Occupational History   • Not on file.     Social History Main Topics   • Smoking status: Former Smoker     Quit date: 2010   • Smokeless tobacco: Never Used   • Alcohol use No   • Drug use: No   • Sexual activity: Defer     Other Topics Concern   • Not on file     Social History Narrative   • No narrative on file         Current Outpatient Prescriptions   Medication Sig Dispense Refill   • aspirin 81 MG EC tablet Take 81 mg by mouth Daily.     • dicyclomine (BENTYL) 20 MG tablet Take 20 mg by mouth Every 6 (Six) Hours.     • FLUoxetine (PROzac) 40 MG capsule Take 40 mg by mouth Every Night.     • fluticasone (FLONASE) 50 MCG/ACT nasal spray 2 sprays into each nostril Daily As Needed.     • levothyroxine (SYNTHROID, LEVOTHROID) 88 MCG tablet Take 1 tablet by mouth Every Morning. 90 tablet 3   • lovastatin (MEVACOR) 40 MG tablet Take 1 tablet by mouth Every Night. 90 tablet 3   • meloxicam (MOBIC) 15 MG tablet Take 15 mg by mouth.     • raNITIdine (ZANTAC) 150 MG tablet Take 150 mg by mouth Every Night.       No current facility-administered medications for this visit.          OBJECTIVE    Pulse 80   Ht 168.9 cm (66.5\")   Wt 80.7 kg (177 lb 14.6 oz)   SpO2 99%   BMI 28.29 kg/m²       Review of Systems   Constitutional: Positive for activity change.   HENT: Negative.    Eyes: Negative.    Respiratory: Negative.    Cardiovascular: Negative.    Gastrointestinal: Negative.    Endocrine: Positive for cold intolerance.   Genitourinary: Negative.    Musculoskeletal: Positive for arthralgias.        Foot pain, ankle pain   Skin: Negative.    Allergic/Immunologic: Negative.    Neurological: Negative.    Hematological: Negative.  "   Psychiatric/Behavioral: Negative.            Physical Exam   Constitutional: She is oriented to person, place, and time. She appears well-developed and well-nourished.   HENT:   Head: Normocephalic and atraumatic.   Right Ear: External ear normal.   Left Ear: External ear normal.   Nose: Nose normal.   Eyes: Conjunctivae and EOM are normal. Pupils are equal, round, and reactive to light.   Pulmonary/Chest: Effort normal. No respiratory distress. She has no wheezes.   Neurological: She is alert and oriented to person, place, and time. She has normal reflexes.   Skin: She is not diaphoretic.   Psychiatric: She has a normal mood and affect. Her behavior is normal.   Vitals reviewed.      Left Lower Extremity    Cardiovascular:    DP/PT pulses palpable    CFT brisk  to all digits  Skin temp is warm to warm from proximal tibia to distal digits  Pedal hair growth present.   Edema and ecchymosis noted diffusely about the left lateral malleolus and hindfoot    Musculoskeletal:  muscle strength is deferred  Pain on palpation to the distal lateral malleolus.  No pain on palpation to the medial deltoid complex or medial malleolus.    Dermatological:   Webspaces 1-4 bilateral are clean, dry and intact.   No subcutaneous nodules or masses noted    No open wounds noted     Neurological:   Protective sensation intact    Sensation intact to light touch          Procedures        ASSESSMENT AND PLAN    Neda was seen today for ankle injury.    Diagnoses and all orders for this visit:    Closed fracture of distal end of left fibula, unspecified fracture morphology, initial encounter      - Comprehensive foot and ankle exam performed.   - X-rays reviewed taken in the urgent care.  Nondisplaced transverse distal fibular fracture noted.  - Dispensed cam boot  - Transition gradually to weightbearing as tolerated in cam boot  - Ice and elevate at home  - All questions were answered to the patients satisfaction.  - RTC 2 weeks, repeat  radiographs          This document has been electronically signed by Grzegorz Snell DPM on March 20, 2018 12:25 PM     3/20/2018  12:25 PM

## 2018-04-03 ENCOUNTER — OFFICE VISIT (OUTPATIENT)
Dept: PODIATRY | Facility: CLINIC | Age: 63
End: 2018-04-03

## 2018-04-03 VITALS — BODY MASS INDEX: 28.59 KG/M2 | WEIGHT: 177.91 LBS | HEIGHT: 66 IN

## 2018-04-03 DIAGNOSIS — S82.832D CLOSED FRACTURE OF DISTAL END OF LEFT FIBULA WITH ROUTINE HEALING, UNSPECIFIED FRACTURE MORPHOLOGY, SUBSEQUENT ENCOUNTER: Primary | ICD-10-CM

## 2018-04-03 PROCEDURE — 99024 POSTOP FOLLOW-UP VISIT: CPT | Performed by: PODIATRIST

## 2018-04-03 NOTE — PROGRESS NOTES
Neda Megha Higginsraymond  1955  63 y.o. female  PCP- Dr. Bee  Patient presents today for left ankle follow up.      Chief Complaint   Patient presents with   • Left Foot - Follow-up           History of Present Illness    Patient presents to clinic for follow-up of her left ankle fracture.  She is currently weightbearing in a cam boot.  She denies pain today.  She has no new pedal complaints.      Past Medical History:   Diagnosis Date   • Acid reflux    • Anxiety    • Arthritis    • Depression    • Disease of thyroid gland    • Tuberculosis          Past Surgical History:   Procedure Laterality Date   • HARDWARE REMOVAL Left 11/6/2017    Procedure: HARDWARE REMOVAL LEFT KNEE                                                  (4-0 cannulated screws two and two free pieces of cerclage wire);  Surgeon: Paul Biggs MD;  Location: [facility];  Service:    • HYSTERECTOMY     • KNEE SURGERY Left     hardware placed secondary to trauma         Family History   Problem Relation Age of Onset   • Heart disease Mother    • Arthritis Mother    • Heart disease Father    • Hypertension Father    • Arthritis Father    • Heart disease Sister    • Hypertension Sister    • Arthritis Sister    • Heart disease Brother    • Hypertension Brother    • Arthritis Brother    • No Known Problems Maternal Grandmother    • No Known Problems Maternal Grandfather    • No Known Problems Paternal Grandmother    • No Known Problems Paternal Grandfather    • Autism Son    • No Known Problems Daughter        Allergies   Allergen Reactions   • Penicillins GI Intolerance       Social History     Social History   • Marital status:      Spouse name: N/A   • Number of children: N/A   • Years of education: N/A     Occupational History   • Not on file.     Social History Main Topics   • Smoking status: Former Smoker     Quit date: 2010   • Smokeless tobacco: Never Used   • Alcohol use No   • Drug use: No   • Sexual activity: Defer     Other  "Topics Concern   • Not on file     Social History Narrative   • No narrative on file         Current Outpatient Prescriptions   Medication Sig Dispense Refill   • aspirin 81 MG EC tablet Take 81 mg by mouth Daily.     • dicyclomine (BENTYL) 20 MG tablet Take 20 mg by mouth Every 6 (Six) Hours.     • FLUoxetine (PROzac) 40 MG capsule Take 40 mg by mouth Every Night.     • FLUoxetine (PROzac) 40 MG capsule Take 1 capsule by mouth Daily. 90 capsule 3   • fluticasone (FLONASE) 50 MCG/ACT nasal spray 2 sprays into each nostril Daily As Needed.     • levothyroxine (SYNTHROID, LEVOTHROID) 88 MCG tablet Take 1 tablet by mouth Every Morning. 90 tablet 3   • lovastatin (MEVACOR) 40 MG tablet Take 1 tablet by mouth Every Night. 90 tablet 3   • meloxicam (MOBIC) 15 MG tablet Take 15 mg by mouth.     • meloxicam (MOBIC) 15 MG tablet Take 1 tablet by mouth Daily with food. 90 tablet 3   • raNITIdine (ZANTAC) 150 MG tablet Take 150 mg by mouth Every Night.       No current facility-administered medications for this visit.          OBJECTIVE    Ht 168.9 cm (66.5\")   Wt 80.7 kg (177 lb 14.6 oz)   BMI 28.29 kg/m²       Review of Systems   Constitutional: Positive for activity change. Negative for chills and fever.   Respiratory: Negative for cough and shortness of breath.    Cardiovascular: Negative for chest pain and leg swelling.   Gastrointestinal: Negative for diarrhea, nausea and vomiting.   Musculoskeletal:        Foot pain, ankle pain   Skin: Negative.    Psychiatric/Behavioral: Negative.            Physical Exam   Constitutional: She is oriented to person, place, and time. She appears well-developed and well-nourished. No distress.   HENT:   Head: Normocephalic and atraumatic.   Nose: Nose normal.   Pulmonary/Chest: Effort normal. No respiratory distress. She has no wheezes.   Neurological: She is alert and oriented to person, place, and time.   Psychiatric: She has a normal mood and affect. Her behavior is normal. "       Left Lower Extremity    Cardiovascular:    DP/PT pulses palpable    Improved edema and ecchymosis noted    Musculoskeletal:  muscle strength is deferred  Improved Pain on palpation to the distal lateral malleolus.    Dermatological:   Webspaces 1-4 bilateral are clean, dry and intact.   No subcutaneous nodules or masses noted    No open wounds noted     Neurological:   Protective sensation intact    Sensation intact to light touch          Procedures        ASSESSMENT AND PLAN    Neda was seen today for follow-up.    Diagnoses and all orders for this visit:    Closed fracture of distal end of left fibula with routine healing, unspecified fracture morphology, subsequent encounter  -     XR Ankle 3+ View Left      - Continue weightbearing as tolerated in Cam Walker  - Ice and elevate at home  - All questions were answered to the patients satisfaction.  - RTC 2 weeks          This document has been electronically signed by Grzegorz Snell DPM on April 3, 2018 3:34 PM     4/3/2018  3:34 PM

## 2018-04-17 ENCOUNTER — OFFICE VISIT (OUTPATIENT)
Dept: PODIATRY | Facility: CLINIC | Age: 63
End: 2018-04-17

## 2018-04-17 VITALS — BODY MASS INDEX: 27.92 KG/M2 | HEIGHT: 67 IN | WEIGHT: 177.91 LBS

## 2018-04-17 DIAGNOSIS — S82.832D CLOSED FRACTURE OF DISTAL END OF LEFT FIBULA WITH ROUTINE HEALING, UNSPECIFIED FRACTURE MORPHOLOGY, SUBSEQUENT ENCOUNTER: Primary | ICD-10-CM

## 2018-04-17 PROCEDURE — 99024 POSTOP FOLLOW-UP VISIT: CPT | Performed by: PODIATRIST

## 2018-04-17 NOTE — PROGRESS NOTES
Neda Meghapascual Ignacio  1955  63 y.o. female  PCP- Dr. Bee  Patient presents today for left ankle follow up.      Chief Complaint   Patient presents with   • Left Foot - Follow-up           History of Present Illness    Patient presents to clinic for follow-up of her left ankle fracture.  She is currently weightbearing in regular shoe gear today.  She denies pain today.  She has no new pedal complaints.      Past Medical History:   Diagnosis Date   • Acid reflux    • Anxiety    • Arthritis    • Depression    • Disease of thyroid gland    • Tuberculosis          Past Surgical History:   Procedure Laterality Date   • HARDWARE REMOVAL Left 11/6/2017    Procedure: HARDWARE REMOVAL LEFT KNEE                                                  (4-0 cannulated screws two and two free pieces of cerclage wire);  Surgeon: Paul Biggs MD;  Location: [facility];  Service:    • HYSTERECTOMY     • KNEE SURGERY Left     hardware placed secondary to trauma         Family History   Problem Relation Age of Onset   • Heart disease Mother    • Arthritis Mother    • Heart disease Father    • Hypertension Father    • Arthritis Father    • Heart disease Sister    • Hypertension Sister    • Arthritis Sister    • Heart disease Brother    • Hypertension Brother    • Arthritis Brother    • No Known Problems Maternal Grandmother    • No Known Problems Maternal Grandfather    • No Known Problems Paternal Grandmother    • No Known Problems Paternal Grandfather    • Autism Son    • No Known Problems Daughter        Allergies   Allergen Reactions   • Penicillins GI Intolerance       Social History     Social History   • Marital status:      Spouse name: N/A   • Number of children: N/A   • Years of education: N/A     Occupational History   • Not on file.     Social History Main Topics   • Smoking status: Former Smoker     Quit date: 2010   • Smokeless tobacco: Never Used   • Alcohol use No   • Drug use: No   • Sexual activity: Defer  "    Other Topics Concern   • Not on file     Social History Narrative   • No narrative on file         Current Outpatient Prescriptions   Medication Sig Dispense Refill   • aspirin 81 MG EC tablet Take 81 mg by mouth Daily.     • dicyclomine (BENTYL) 20 MG tablet Take 20 mg by mouth Every 6 (Six) Hours.     • FLUoxetine (PROzac) 40 MG capsule Take 40 mg by mouth Every Night.     • FLUoxetine (PROzac) 40 MG capsule Take 1 capsule by mouth Daily. 90 capsule 3   • fluticasone (FLONASE) 50 MCG/ACT nasal spray 2 sprays into each nostril Daily As Needed.     • levothyroxine (SYNTHROID, LEVOTHROID) 88 MCG tablet Take 1 tablet by mouth Every Morning. 90 tablet 3   • lovastatin (MEVACOR) 40 MG tablet Take 1 tablet by mouth Every Night. 90 tablet 3   • meloxicam (MOBIC) 15 MG tablet Take 15 mg by mouth.     • meloxicam (MOBIC) 15 MG tablet Take 1 tablet by mouth Daily with food. 90 tablet 3   • raNITIdine (ZANTAC) 150 MG tablet Take 150 mg by mouth Every Night.       No current facility-administered medications for this visit.          OBJECTIVE    Ht 168.9 cm (66.5\")   Wt 80.7 kg (177 lb 14.6 oz)   BMI 28.29 kg/m²       Review of Systems   Constitutional: Positive for activity change. Negative for chills and fever.   Respiratory: Negative for cough and shortness of breath.    Cardiovascular: Negative for chest pain and leg swelling.   Gastrointestinal: Negative for diarrhea, nausea and vomiting.   Musculoskeletal:        Foot pain, ankle pain   Skin: Negative.    Psychiatric/Behavioral: Negative.            Physical Exam   Constitutional: She is oriented to person, place, and time. She appears well-developed and well-nourished. No distress.   HENT:   Head: Normocephalic and atraumatic.   Nose: Nose normal.   Pulmonary/Chest: Effort normal. No respiratory distress. She has no wheezes.   Neurological: She is alert and oriented to person, place, and time.   Psychiatric: She has a normal mood and affect. Her behavior is " normal.       Left Lower Extremity    Cardiovascular:    DP/PT pulses palpable    No edema noted    Musculoskeletal:  muscle strength is deferred  No Pain on palpation to the distal lateral malleolus.    Dermatological:   Webspaces 1-4 bilateral are clean, dry and intact.   No subcutaneous nodules or masses noted    No open wounds noted     Neurological:   Protective sensation intact    Sensation intact to light touch          Procedures        ASSESSMENT AND PLAN    Neda was seen today for follow-up.    Diagnoses and all orders for this visit:    Closed fracture of distal end of left fibula with routine healing, unspecified fracture morphology, subsequent encounter  -     XR Ankle 3+ View Left      - dispensed lace up ankle brace.   - Ice and elevate at home as needed  - All questions were answered to the patients satisfaction.  - RTC 4 weeks          This document has been electronically signed by Grzegorz Snell DPM on April 17, 2018 10:33 AM     4/17/2018  10:33 AM

## 2018-05-15 ENCOUNTER — OFFICE VISIT (OUTPATIENT)
Dept: PODIATRY | Facility: CLINIC | Age: 63
End: 2018-05-15

## 2018-05-15 VITALS — BODY MASS INDEX: 27.92 KG/M2 | HEIGHT: 67 IN | WEIGHT: 177.91 LBS | OXYGEN SATURATION: 98 % | HEART RATE: 82 BPM

## 2018-05-15 DIAGNOSIS — S82.832D CLOSED FRACTURE OF DISTAL END OF LEFT FIBULA WITH ROUTINE HEALING, UNSPECIFIED FRACTURE MORPHOLOGY, SUBSEQUENT ENCOUNTER: Primary | ICD-10-CM

## 2018-05-15 PROCEDURE — 99024 POSTOP FOLLOW-UP VISIT: CPT | Performed by: PODIATRIST

## 2018-05-15 NOTE — PROGRESS NOTES
Neda Megha Mateus  1955  63 y.o. female  PCP- Dr. Bee  Patient presents today for left ankle follow up.  Patient rates her pain 3/10.      Chief Complaint   Patient presents with   • Left Ankle - Follow-up     History of Present Illness    Patient presents to clinic for follow-up of her left ankle fracture.   She denies pain today.  She is in regular shoe gear and back to work. No new pedal complaints.       Past Medical History:   Diagnosis Date   • Acid reflux    • Anxiety    • Arthritis    • Depression    • Disease of thyroid gland    • Tuberculosis          Past Surgical History:   Procedure Laterality Date   • HARDWARE REMOVAL Left 11/6/2017    Procedure: HARDWARE REMOVAL LEFT KNEE                                                  (4-0 cannulated screws two and two free pieces of cerclage wire);  Surgeon: Paul Biggs MD;  Location: [facility];  Service:    • HYSTERECTOMY     • KNEE SURGERY Left     hardware placed secondary to trauma         Family History   Problem Relation Age of Onset   • Heart disease Mother    • Arthritis Mother    • Heart disease Father    • Hypertension Father    • Arthritis Father    • Heart disease Sister    • Hypertension Sister    • Arthritis Sister    • Heart disease Brother    • Hypertension Brother    • Arthritis Brother    • No Known Problems Maternal Grandmother    • No Known Problems Maternal Grandfather    • No Known Problems Paternal Grandmother    • No Known Problems Paternal Grandfather    • Autism Son    • No Known Problems Daughter        Allergies   Allergen Reactions   • Penicillins GI Intolerance       Social History     Social History   • Marital status:      Spouse name: N/A   • Number of children: N/A   • Years of education: N/A     Occupational History   • Not on file.     Social History Main Topics   • Smoking status: Former Smoker     Quit date: 2010   • Smokeless tobacco: Never Used   • Alcohol use No   • Drug use: No   • Sexual activity:  "Defer     Other Topics Concern   • Not on file     Social History Narrative   • No narrative on file         Current Outpatient Prescriptions   Medication Sig Dispense Refill   • aspirin 81 MG EC tablet Take 81 mg by mouth Daily.     • dicyclomine (BENTYL) 20 MG tablet Take 20 mg by mouth Every 6 (Six) Hours.     • FLUoxetine (PROzac) 40 MG capsule Take 40 mg by mouth Every Night.     • FLUoxetine (PROzac) 40 MG capsule Take 1 capsule by mouth Daily. 90 capsule 3   • fluticasone (FLONASE) 50 MCG/ACT nasal spray 2 sprays into each nostril Daily As Needed.     • levothyroxine (SYNTHROID, LEVOTHROID) 88 MCG tablet Take 1 tablet by mouth Every Morning. 90 tablet 3   • lovastatin (MEVACOR) 40 MG tablet Take 1 tablet by mouth Every Night. 90 tablet 3   • meloxicam (MOBIC) 15 MG tablet Take 15 mg by mouth.     • meloxicam (MOBIC) 15 MG tablet Take 1 tablet by mouth Daily with food. 90 tablet 3   • raNITIdine (ZANTAC) 150 MG tablet Take 150 mg by mouth Every Night.       No current facility-administered medications for this visit.          OBJECTIVE    Pulse 82   Ht 168.9 cm (66.5\")   Wt 80.7 kg (177 lb 14.6 oz)   SpO2 98%   BMI 28.29 kg/m²       Review of Systems   Constitutional: Positive for activity change. Negative for chills and fever.   Respiratory: Negative for cough and shortness of breath.    Cardiovascular: Negative for chest pain and leg swelling.   Gastrointestinal: Negative for diarrhea, nausea and vomiting.   Musculoskeletal:        Foot pain, ankle pain   Skin: Negative.    Psychiatric/Behavioral: Negative.            Physical Exam   Constitutional: She is oriented to person, place, and time. She appears well-developed and well-nourished. No distress.   HENT:   Head: Normocephalic and atraumatic.   Nose: Nose normal.   Pulmonary/Chest: Effort normal. No respiratory distress. She has no wheezes.   Neurological: She is alert and oriented to person, place, and time.   Psychiatric: She has a normal mood and " affect. Her behavior is normal.       Left Lower Extremity    Cardiovascular:    DP/PT pulses palpable    No edema noted    Musculoskeletal:  muscle strength is deferred  No pain on palpation to the distal lateral malleolus.    Dermatological:   Webspaces 1-4 bilateral are clean, dry and intact.   No subcutaneous nodules or masses noted    No open wounds noted     Neurological:   Protective sensation intact    Sensation intact to light touch          Procedures        ASSESSMENT AND PLAN    Neda was seen today for follow-up.    Diagnoses and all orders for this visit:    Closed fracture of distal end of left fibula with routine healing, unspecified fracture morphology, subsequent encounter  -     XR Ankle 3+ View Left      - X-rays taken and reviewed.  Healing lateral malleolus fracture noted.  - Patient is discharged from care at this time.  - All questions were answered to the patients satisfaction.  - RTC as needed          This document has been electronically signed by Grzegorz Snell DPM on May 15, 2018 12:56 PM     5/15/2018  12:56 PM

## 2018-06-08 RX ORDER — LOVASTATIN 40 MG/1
40 TABLET ORAL NIGHTLY
Qty: 90 TABLET | Refills: 3 | Status: CANCELLED | OUTPATIENT
Start: 2018-06-08

## 2018-06-08 RX ORDER — LEVOTHYROXINE SODIUM 88 UG/1
88 TABLET ORAL EVERY MORNING
Qty: 90 TABLET | Refills: 3 | Status: CANCELLED | OUTPATIENT
Start: 2018-06-08

## 2018-07-11 RX ORDER — FLUOXETINE HYDROCHLORIDE 40 MG/1
40 CAPSULE ORAL DAILY
Qty: 90 CAPSULE | Refills: 3 | Status: CANCELLED | OUTPATIENT
Start: 2018-07-11

## 2018-07-11 RX ORDER — MELOXICAM 15 MG/1
15 TABLET ORAL DAILY
Qty: 90 TABLET | Refills: 3 | Status: CANCELLED | OUTPATIENT
Start: 2018-07-11

## 2020-01-09 ENCOUNTER — APPOINTMENT (OUTPATIENT)
Dept: GENERAL RADIOLOGY | Facility: HOSPITAL | Age: 65
End: 2020-01-09

## 2020-01-09 ENCOUNTER — HOSPITAL ENCOUNTER (EMERGENCY)
Facility: HOSPITAL | Age: 65
Discharge: HOME | End: 2020-01-09
Attending: EMERGENCY MEDICINE | Admitting: EMERGENCY MEDICINE

## 2020-01-09 VITALS
WEIGHT: 179.4 LBS | RESPIRATION RATE: 18 BRPM | BODY MASS INDEX: 28.16 KG/M2 | HEART RATE: 94 BPM | TEMPERATURE: 98.2 F | SYSTOLIC BLOOD PRESSURE: 153 MMHG | DIASTOLIC BLOOD PRESSURE: 97 MMHG | HEIGHT: 67 IN | OXYGEN SATURATION: 96 %

## 2020-01-09 DIAGNOSIS — M15.3 SECONDARY MULTIPLE ARTHRITIS: ICD-10-CM

## 2020-01-09 DIAGNOSIS — M25.562 ACUTE PAIN OF LEFT KNEE: Primary | ICD-10-CM

## 2020-01-09 PROCEDURE — 99283 EMERGENCY DEPT VISIT LOW MDM: CPT

## 2020-01-09 PROCEDURE — 73564 X-RAY EXAM KNEE 4 OR MORE: CPT

## 2020-01-09 RX ORDER — IBUPROFEN 400 MG/1
400 TABLET ORAL 2 TIMES DAILY WITH MEALS
Qty: 28 TABLET | Refills: 0 | Status: SHIPPED | OUTPATIENT
Start: 2020-01-09 | End: 2020-01-23

## 2020-01-09 RX ORDER — IBUPROFEN 400 MG/1
400 TABLET ORAL ONCE
Status: COMPLETED | OUTPATIENT
Start: 2020-01-09 | End: 2020-01-09

## 2020-01-09 RX ORDER — ACETAMINOPHEN 325 MG/1
650 TABLET ORAL EVERY 6 HOURS PRN
COMMUNITY

## 2020-01-09 RX ORDER — IBUPROFEN 400 MG/1
400 TABLET ORAL 2 TIMES DAILY WITH MEALS
Qty: 180 TABLET | Refills: 0 | Status: SHIPPED | OUTPATIENT
Start: 2020-01-09 | End: 2020-01-09 | Stop reason: SDUPTHER

## 2020-01-09 RX ADMIN — IBUPROFEN 400 MG: 400 TABLET, FILM COATED ORAL at 18:49

## 2020-01-09 NOTE — ED NOTES
Pt presents to the ED with c/o left knee pain. Pt reports she heard a loud pop and now has pain with ambulation. Pt denies pain while sitting. Pt reports she had surgery on her knee 4 years ago.      Beatriz Garcia RN  01/09/20 [id number]

## 2020-01-09 NOTE — ED PROVIDER NOTES
"Subjective   Patient presents to emergency department for left knee pain following an injury today prior to arrival.  She works in  here in the hospital.  Endorses previous surgery to left knee from trauma and had hardware removal by Dr. Biggs.  States she is able to bear weight with normal pain but that her left knee feels unstable.  Denies numbness tingling weakness.      History provided by:  Patient   used: No    Knee Pain   Location:  Knee  Time since incident:  1 hour  Injury: yes    Knee location:  L knee  Pain details:     Quality:  Aching    Radiates to:  Does not radiate    Severity:  Mild    Timing:  Constant  Chronicity:  New  Associated symptoms: no decreased ROM, no fever, no numbness and no tingling    Associated symptoms comment:  States her left knee feels unstable \"like something released and it's going to give out\".      Review of Systems   Constitutional: Negative for chills and fever.   Eyes: Negative for visual disturbance.   Respiratory: Negative for shortness of breath.    Cardiovascular: Negative for chest pain.   Gastrointestinal: Negative for abdominal pain.   Musculoskeletal: Positive for arthralgias and joint swelling.   Skin: Negative for color change.   Neurological: Negative for weakness.       Past Medical History:   Diagnosis Date   • Acid reflux    • Anxiety    • Arthritis    • Depression    • Disease of thyroid gland    • Tuberculosis        Allergies   Allergen Reactions   • Penicillins GI Intolerance       Past Surgical History:   Procedure Laterality Date   • HARDWARE REMOVAL Left 11/6/2017    Procedure: HARDWARE REMOVAL LEFT KNEE                                                  (4-0 cannulated screws two and two free pieces of cerclage wire);  Surgeon: Paul Biggs MD;  Location: [facility];  Service:    • HYSTERECTOMY     • KNEE SURGERY Left     hardware placed secondary to trauma       Family History   Problem Relation Age of Onset " "  • Heart disease Mother    • Arthritis Mother    • Heart disease Father    • Hypertension Father    • Arthritis Father    • Heart disease Sister    • Hypertension Sister    • Arthritis Sister    • Heart disease Brother    • Hypertension Brother    • Arthritis Brother    • No Known Problems Maternal Grandmother    • No Known Problems Maternal Grandfather    • No Known Problems Paternal Grandmother    • No Known Problems Paternal Grandfather    • Autism Son    • No Known Problems Daughter        Social History     Socioeconomic History   • Marital status:      Spouse name: Not on file   • Number of children: Not on file   • Years of education: Not on file   • Highest education level: Not on file   Tobacco Use   • Smoking status: Former Smoker     Last attempt to quit: 2010     Years since quitting: 10.0   • Smokeless tobacco: Never Used   Substance and Sexual Activity   • Alcohol use: No   • Drug use: No   • Sexual activity: Defer           Objective      /97 (BP Location: Right arm, Patient Position: Sitting)   Pulse 94   Temp 98.2 °F (36.8 °C) (Oral)   Resp 18   Ht 168.9 cm (66.5\")   Wt 81.4 kg (179 lb 6.4 oz)   SpO2 96%   BMI 28.52 kg/m²     Physical Exam   Constitutional: She appears well-developed and well-nourished. No distress.   HENT:   Head: Normocephalic and atraumatic.   Eyes: Conjunctivae are normal.   Cardiovascular: Normal rate, regular rhythm, normal heart sounds and intact distal pulses.   Pulmonary/Chest: Effort normal and breath sounds normal. No respiratory distress. She has no wheezes.   Musculoskeletal: Normal range of motion. She exhibits tenderness (posterior (L) knee). She exhibits no edema or deformity.   Neurological: She is alert.   Skin: Skin is warm. Capillary refill takes less than 2 seconds.   Psychiatric: She has a normal mood and affect. Her behavior is normal. Thought content normal.   Nursing note and vitals reviewed.      Procedures           ED Course      Xr " Knee 4+ View Left    Result Date: 1/9/2020  Narrative: Four view left knee HISTORY: Pain after twisting knee. Left knee feels unstable. AP, lateral, tunnel and oblique views obtained. COMPARISON: October 31, 2017 FINDINGS: No acute fracture or dislocation. Healed patellar fracture. Screws and cerclage wires have been removed. Small patellar, femoral and tibial spurs. Minimal narrowing of the medial joint space. Very small suprapatellar effusion. No other osseous or articular abnormality.     Impression: CONCLUSION: No acute fracture or dislocation. Healed patellar fracture. Screws and cerclage wires have been removed. Small patellar, femoral and tibial spurs. Minimal narrowing of the medial joint space. Very small suprapatellar effusion. [id number] Electronically signed by:  Barrie Mi MD  1/9/2020 6:11 PM CST Workstation: [id number]         Discussed results with patient.  Gave educational materials.  Advised close follow up with Orthopedics.  Return to emergency department for new or worsening symptoms.                                        [facility]    Final diagnoses:   Acute pain of left knee            West Marrufo PA-C  01/09/20 1833

## 2020-01-10 DIAGNOSIS — M25.562 LEFT KNEE PAIN, UNSPECIFIED CHRONICITY: Primary | ICD-10-CM

## 2020-01-13 ENCOUNTER — TRANSCRIBE ORDERS (OUTPATIENT)
Dept: PHYSICAL THERAPY | Facility: HOSPITAL | Age: 65
End: 2020-01-13

## 2020-01-13 DIAGNOSIS — S86.912A KNEE STRAIN, LEFT, INITIAL ENCOUNTER: Primary | ICD-10-CM

## 2020-01-15 ENCOUNTER — HOSPITAL ENCOUNTER (OUTPATIENT)
Dept: PHYSICAL THERAPY | Facility: HOSPITAL | Age: 65
Setting detail: THERAPIES SERIES
Discharge: HOME | End: 2020-01-15

## 2020-01-15 DIAGNOSIS — S86.912A KNEE STRAIN, LEFT, INITIAL ENCOUNTER: Primary | ICD-10-CM

## 2020-01-15 PROCEDURE — 97161 PT EVAL LOW COMPLEX 20 MIN: CPT | Performed by: PHYSICAL THERAPIST

## 2020-01-15 NOTE — THERAPY EVALUATION
Outpatient Physical Therapy Ortho Initial Evaluation  [facility]     Patient Name: Neda Ignacio  : 1955  MRN: [id number]  Today's Date: 1/15/2020      Visit Date: 01/15/2020  Attendance:   Subjective Improvement: NA  Next MD Appt: TBD  Recert Date: 20    Therapy Diagnosis: Left knee strain     Allergies       PenicillinsGI Intolerance   Prateek as Reviewed Reviewed by You at 1:09 PM CST     Medications (Admitted on 1/15/2020)     acetaminophen (TYLENOL) 325 MG tablet     aspirin 81 MG EC tablet     dicyclomine (BENTYL) 20 MG tablet     dicyclomine (BENTYL) 20 MG tablet     FLUoxetine (PROzac) 20 MG capsule     FLUoxetine (PROzac) 40 MG capsule     FLUoxetine (PROzac) 40 MG capsule     fluticasone (FLONASE) 50 MCG/ACT nasal spray     fluticasone (FLONASE) 50 MCG/ACT nasal spray     fluticasone (FLONASE) 50 MCG/ACT nasal spray     ibuprofen (ADVIL,MOTRIN) 400 MG tablet     ibuprofen (ADVIL,MOTRIN) 400 MG tablet     levothyroxine (SYNTHROID, LEVOTHROID) 112 MCG tablet     montelukast (SINGULAIR) 10 MG tablet     raNITIdine (ZANTAC) 150 MG tablet     raNITIdine (ZANTAC) 300 MG tablet     raNITIdine (ZANTAC) 300 MG tablet     rosuvastatin (CRESTOR) 5 MG tablet        Patient Active Problem List   Diagnosis   • Chronic pain of left knee   • History of fracture of patella        Past Medical History:   Diagnosis Date   • Acid reflux    • Anxiety    • Arthritis    • Depression    • Disease of thyroid gland    • Tuberculosis         Past Surgical History:   Procedure Laterality Date   • HARDWARE REMOVAL Left 2017    Procedure: HARDWARE REMOVAL LEFT KNEE                                                  (4-0 cannulated screws two and two free pieces of cerclage wire);  Surgeon: Paul Biggs MD;  Location: [facility];  Service:    • HYSTERECTOMY     • KNEE SURGERY Left     hardware placed secondary to trauma       Visit Dx:     ICD-10-CM ICD-9-CM   1. Knee strain, left, initial encounter  S86.912A 844.9         Patient History     Row Name 01/15/20 1300             History    Chief Complaint  Pain;Difficulty Walking;Joint stiffness;Muscle weakness  -SW      Type of Pain  Knee pain  -SW      Brief Description of Current Complaint  64 yof began having left knee pain when turning at work on 1-9-20. She went to Er that night and was given a knee brace. She was advised to use crutches until this morning. She started using standard cane today, but feels she may still need to use crutches. She fractured left patella about 4 years ago. She has had hardware from this removed. X-rays show patelar/femoral/tibial spurs , but no current fracture or dislocation.   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 She went to Er that night and was given a knee brace. She was advised to use crutches and used them until this morning. She started using standard cane today, but feels she may still need to use crutches. She fractured left patella about 4 years ago. She has had hardware from this removed. X-rays show patelar/femoral/tibial spurs , but no current fracture or dislocation.   -SW       Subjective Pain    Able to rate subjective pain?  yes  -SW    Pre-Treatment Pain Level  1  -SW       Special Tests/Palpation    Special Tests/Palpation  -- tenderness medial and lateral joint line  -SW       Knee Special Tests    Anterior drawer (ACL lesion)  Left:;Negative  -SW    Lachman’s (ACL lesion)  Left:;Negative  -SW    Valgus  stress (MCL lesion)  Left:;Positive @20 degrees flexion  -SW    Varus stress (LCL lesion)  Left:;Negative  -SW    Patricia’s test (meniscal lesion)  Left:;Positive  -SW       Left Lower Ext    Lt Knee Extension/Flexion AROM  0-122  -SW       MMT Left Lower Ext    Lt Hip Flexion MMT, Gross Movement  (4+/5) good plus  -SW    Lt Knee Extension MMT, Gross Movement  (4/5) good  -SW    Lt Knee Flexion MMT, Gross Movement  (4/5) good  -SW       Gait/Stairs Assessment/Training    [place] Level (Gait)  independent  -SW    Assistive Device (Gait)  cane, straight  -SW    Deviations/Abnormal Patterns (Gait)  antalgic poor heel toe pattern  -SW      User Key  (r) = Recorded By, (t) = Taken By, (c) = Cosigned By    Initials Name Provider Type    Ting White Physical Therapist                      Therapy Education  Education Details: HEP per flowsheet  Given: HEP, Symptoms/condition management, Posture/body mechanics  Program: New  How Provided: Verbal, Demonstration, Written  Provided to: Patient  Level of Understanding: Verbalized, Demonstrated     PT OP Goals     Row Name 01/15/20 1300          PT Short Term Goals    STG Date to Achieve  02/05/20  -     STG 1  Patient will be independent with HEP.   -     STG 2  Pateint will exhibit left knee ROM equal to right.   -        Long Term Goals    LTG Date to Achieve  02/26/20  -     LTG 1  Patient will score 40 on LEFS.   -     LTG 2  Patient will be able to perform 20 consecutive sit to stand without UE support and without an increase in symptoms.   -     LTG 3  Patient will be able to ambulate 2 blocks without AD and with good gait mechanics.   -        Time Calculation    PT Goal Re-Cert Due Date  02/05/20  -       User Key  (r) = Recorded By, (t) = Taken By, (c) = Cosigned By    Initials Name Provider Type    Ting White Physical Therapist          PT Assessment/Plan     Row Name 01/15/20 1300          PT Assessment    Functional Limitations   Decreased safety during functional activities;Impaired gait;Impaired locomotion;Limitation in home management;Limitations in community activities;Limitations in functional capacity and performance;Performance in leisure activities;Performance in self-care ADL;Performance in work activities  -     Impairments  Balance;Gait;Muscle strength;Pain;Range of motion  -     Assessment Comments  64 yof presents with acute left knee pain following a twisting injury with reduced left knee ROM, strength, and gait. Treatment will focus on these impairments.   -     Rehab Potential  Good  -     Patient/caregiver participated in establishment of treatment plan and goals  Yes  -SW     Patient would benefit from skilled therapy intervention  Yes  -SW        PT Plan    PT Frequency  2x/week  -SW     Predicted Duration of Therapy Intervention (Therapy Eval)  6 weeks  -     Planned CPT's?  PT EVAL LOW COMPLEXITY: 62779;PT THER PROC EA 15 MIN: 04221;PT RE-EVAL: 58730;PT THER ACT EA 15 MIN: 91202;PT MANUAL THERAPY EA 15 MIN: 28398;PT NEUROMUSC RE-EDUCATION EA 15 MIN: 77644;PT SELF CARE/HOME MGMT/TRAIN EA 15: 24123;PT HOT OR COLD PACK TREAT MCARE  -     Physical Therapy Interventions (Optional Details)  balance training;home exercise program;manual therapy techniques;patient/family education;ROM (Range of Motion);stair training;strengthening;stretching  -     PT Plan Comments  Focus initially on restoring ROM and proper gait mechanics and gentle strengthening.  Patient advised to go back to using crutches to reduce pain and improve heel toe pattern.   -       User Key  (r) = Recorded By, (t) = Taken By, (c) = Cosigned By    Initials Name Provider Type    Ting White Physical Therapist            OP Exercises     Row Name 01/15/20 1300             Subjective Comments    Subjective Comments  64 yof began having left knee pain when turning at work on 1-9-20. She went to Er that night and was given a knee brace. She was  "advised to use crutches and used them until this morning. She started using standard cane today, but feels she may still need to use crutches. She fractured left patella about 4 years ago. She has had hardware from this removed. X-rays show patelar/femoral/tibial spurs , but no current fracture or dislocation.   -SW         Subjective Pain    Able to rate subjective pain?  yes  -SW      Pre-Treatment Pain Level  1  -SW         Exercise 1    Exercise Name 1  quad sets  -SW      Reps 1  10\"x10  -SW         Exercise 2    Exercise Name 2  heel slides  -SW      Reps 2  10\"x10  -SW         Exercise 3    Exercise Name 3  straight leg raise  -SW      Reps 3  20  -SW         Exercise 4    Exercise Name 4  bridge  -SW      Reps 4  20  -SW        User Key  (r) = Recorded By, (t) = Taken By, (c) = Cosigned By    Initials Name Provider Type    Ting White Physical Therapist                        Outcome Measure Options: Lower Extremity Functional Scale (LEFS)  Lower Extremity Functional Index  Any of your usual work, housework or school activities: Quite a bit of difficulty  Your usual hobbies, recreational or sporting activities: Extreme difficulty or unable to perform activity  Getting into or out of the bath: Moderate difficulty  Walking between rooms: Moderate difficulty  Putting on your shoes or socks: Moderate difficulty  Squatting: Extreme difficulty or unable to perform activity  Lifting an object, like a bag of groceries from the floor: A little bit of difficulty  Performing light activities around your home: Moderate difficulty  Performing heavy activities around your home: Extreme difficulty or unable to perform activity  Getting into or out of a car: Moderate difficulty  Walking 2 blocks: Extreme difficulty or unable to perform activity  Walking a mile: Extreme difficulty or unable to perform activity  Going up or down 10 stairs (about 1 flight of stairs): Extreme difficulty or unable to perform " activity  Standing for 1 hour: Extreme difficulty or unable to perform activity  Sitting for 1 hour: No difficulty  Running on even ground: Extreme difficulty or unable to perform activity  Running on uneven ground: Extreme difficulty or unable to perform activity  Making sharp turns while running fast: Extreme difficulty or unable to perform activity  Hopping: Extreme difficulty or unable to perform activity  Rolling over in bed: A little bit of difficulty  Total: 21      Time Calculation:     Start Time: 1300  Stop Time: 1340  Time Calculation (min): 40 min     Therapy Charges for Today     Code Description Service Date Service Provider Modifiers Qty    39282578519 HC PT EVAL LOW COMPLEXITY 1 1/15/2020 Ting Carrion GP 1    97850977681 HC PT THER SUPP EA 15 MIN 1/15/2020 Ting Carrion GP 1          PT G-Codes  Outcome Measure Options: Lower Extremity Functional Scale (LEFS)  Total: 21         Ting Carrion  1/15/2020

## 2020-01-22 ENCOUNTER — HOSPITAL ENCOUNTER (OUTPATIENT)
Dept: PHYSICAL THERAPY | Facility: HOSPITAL | Age: 65
Setting detail: THERAPIES SERIES
Discharge: HOME | End: 2020-01-22

## 2020-01-22 DIAGNOSIS — S86.912A KNEE STRAIN, LEFT, INITIAL ENCOUNTER: Primary | ICD-10-CM

## 2020-01-22 PROCEDURE — G0283 ELEC STIM OTHER THAN WOUND: HCPCS

## 2020-01-22 PROCEDURE — 97110 THERAPEUTIC EXERCISES: CPT

## 2020-01-22 NOTE — THERAPY TREATMENT NOTE
"    Outpatient Physical Therapy Ortho Treatment Note  [facility]     Patient Name: Neda Ignacio  : 1955  MRN: [id number]  Today's Date: 2020      Visit Date: 2020     Subjective Improvement: 0%  Attendance:  2/2 (3x/wk for 4 weeks, 12 visits)  Next MD Visit : TBD  Recert Date:  2020      Therapy Diagnosis:  Left Knee Strain         Visit Dx:    ICD-10-CM ICD-9-CM   1. Knee strain, left, initial encounter S86.912A 844.9       Patient Active Problem List   Diagnosis   • Chronic pain of left knee   • History of fracture of patella        Past Medical History:   Diagnosis Date   • Acid reflux    • Anxiety    • Arthritis    • Depression    • Disease of thyroid gland    • Tuberculosis         Past Surgical History:   Procedure Laterality Date   • HARDWARE REMOVAL Left 2017    Procedure: HARDWARE REMOVAL LEFT KNEE                                                  (4-0 cannulated screws two and two free pieces of cerclage wire);  Surgeon: Paul Biggs MD;  Location: [facility];  Service:    • HYSTERECTOMY     • KNEE SURGERY Left     hardware placed secondary to trauma       PT Ortho     Row Name 20 0700       Precautions and Contraindications    Precautions/Limitations  no known precautions/limitations  -       Subjective Pain    Post-Treatment Pain Level  -- \"fine\"  -       Posture/Observations    Posture/Observations Comments  Slow Gait w/ B crutches, decreased WB through L LE; Double upright hinge brace L  -KH       Left Lower Ext    Lt Knee Extension/Flexion AROM  0-125  -KH      User Key  (r) = Recorded By, (t) = Taken By, (c) = Cosigned By    Initials Name Provider Type    Marcia Baker PTA Physical Therapy Assistant                      PT Assessment/Plan     Row Name 20 0700          PT Assessment    Assessment Comments  complaint with HEP at this time. Improved flexion but continues with pain  with flexion; Progressing well with gait but must use " "crutches and brace at this time secondary to instability noted in knee by patient;  No change in HEP this date; Progressing well with current treatment  -        PT Plan    PT Frequency  2x/week;3x/week Comp wants 3x/wk for 4 weeks  -MAYELA     Predicted Duration of Therapy Intervention (Therapy Eval)  6 weeks  -     PT Plan Comments  Possible pro ll next visit; will continue with strength and ROM with progression of gait with less restrictive AD as able.   -       User Key  (r) = Recorded By, (t) = Taken By, (c) = Cosigned By    Initials Name Provider Type    Marcia Baker PTA Physical Therapy Assistant          Modalities     Row Name 01/22/20 0700             Ice    Ice Applied  Yes  -      Location  L knee w/ elevation & IFC  -      Rx Minutes  15 mins  -      Ice S/P Rx  Yes  -         ELECTRICAL STIMULATION    Attended/Unattended  Unattended  -      Stimulation Type  IFC Low  -      Location/Electrode Placement/Other  L knee with elevation & ICE  -KH       PT E-Stim Unattended (Manual) Minutes  15  -        User Key  (r) = Recorded By, (t) = Taken By, (c) = Cosigned By    Initials Name Provider Type    Marcia Baker PTA Physical Therapy Assistant        OP Exercises     Row Name 01/22/20 0700             Subjective Comments    Subjective Comments  Pt reports that she is continuing to do exercises at home and it does make her pain increase; Bending exercises cause her pain and makes the knee swell; reports at rest that the knee does not hurt; Has to use the brace and crutches when up and on it because if not it just feels so unstable and painful. Pain comes from the back of the knee and when she is up and on it she has pain in her ankle.   -         Subjective Pain    Able to rate subjective pain?  yes  -MAYEAL      Pre-Treatment Pain Level  -- 0.5/10 resting; 1/10 walking w/ brace/crutches; 4/10 w/ ex  -MAYELA      Post-Treatment Pain Level  -- \"fine\"  -         Exercise 1    Exercise Name " "1  quad sets  -KH      Cueing 1  Verbal  -KH      Sets 1  3  -KH      Reps 1  10  -KH      Time 1  5\" holds  -KH         Exercise 2    Exercise Name 2  ham sets  -KH      Cueing 2  Verbal  -KH      Sets 2  2  -KH      Reps 2  10  -KH      Time 2  5\" holds  -KH         Exercise 3    Exercise Name 3  SLR flexion. ext,abd  -KH      Cueing 3  Verbal  -KH      Sets 3  2  -KH      Reps 3  10  -KH         Exercise 4    Exercise Name 4  SAQ w/ sm bolster  -KH      Cueing 4  Verbal  -KH      Sets 4  2  -KH      Reps 4  10  -KH         Exercise 5    Exercise Name 5  HC/Ham stretch with strap   -KH      Cueing 5  Verbal  -KH      Sets 5  3  -KH      Time 5  30\"   -KH         Exercise 6    Exercise Name 6  Bridges  -KH      Cueing 6  Verbal  -KH      Sets 6  2  -KH      Reps 6  10  -KH        User Key  (r) = Recorded By, (t) = Taken By, (c) = Cosigned By    Initials Name Provider Type    Marcia Baker PTA Physical Therapy Assistant                       PT OP Goals     Row Name 01/22/20 0700          PT Short Term Goals    STG Date to Achieve  02/05/20  -KH     STG 1  Patient will be independent with HEP.   -KH     STG 1 Progress  Ongoing  -KH     STG 2  Pateint will exhibit left knee ROM equal to right.   -     STG 2 Progress  Progressing  -KH        Long Term Goals    LTG Date to Achieve  02/26/20  -KH     LTG 1  Patient will score 40 on LEFS.   -KH     LTG 1 Progress  Progressing  -KH     LTG 2  Patient will be able to perform 20 consecutive sit to stand without UE support and without an increase in symptoms.   -     LTG 2 Progress  Progressing  -KH     LTG 3  Patient will be able to ambulate 2 blocks without AD and with good gait mechanics.   -KH     LTG 3 Progress  Progressing  -KH        Time Calculation    PT Goal Re-Cert Due Date  02/05/20  -       User Key  (r) = Recorded By, (t) = Taken By, (c) = Cosigned By    Initials Name Provider Type    Marcia Baker PTA Physical Therapy Assistant                   "       Time Calculation:   Start Time: 0730  Stop Time: 0825  Time Calculation (min): 55 min  Total Timed Code Minutes- PT: 40 minute(s)  Therapy Charges for Today     Code Description Service Date Service Provider Modifiers Qty    01244038556 HC PT ELECTRICAL STIM UNATTENDED 1/22/2020 Marcia Rios PTA  1    77251339708 HC PT THER SUPP EA 15 MIN 1/22/2020 Marcia Rios PTA GP 1    05421414066 HC PT THER PROC EA 15 MIN 1/22/2020 Marcia Rios PTA GP 3                    Marcia Rios PTA  1/22/2020

## 2020-01-23 ENCOUNTER — HOSPITAL ENCOUNTER (OUTPATIENT)
Dept: PHYSICAL THERAPY | Facility: HOSPITAL | Age: 65
Setting detail: THERAPIES SERIES
Discharge: HOME | End: 2020-01-23

## 2020-01-23 DIAGNOSIS — S86.912A KNEE STRAIN, LEFT, INITIAL ENCOUNTER: Primary | ICD-10-CM

## 2020-01-23 PROCEDURE — 97110 THERAPEUTIC EXERCISES: CPT

## 2020-01-23 PROCEDURE — G0283 ELEC STIM OTHER THAN WOUND: HCPCS

## 2020-01-23 NOTE — THERAPY TREATMENT NOTE
Outpatient Physical Therapy Ortho Treatment Note  [facility]     Patient Name: Neda Ignacio  : 1955  MRN: [id number]  Today's Date: 2020      Visit Date: 2020     Subjective Improvement: 0%  Attendance:  3/3 (3x/wk for 4 weeks, 12 visits)  Next MD Visit : TBD  Recert Date:  2020        Therapy Diagnosis:  Left Knee Strain     Visit Dx:    ICD-10-CM ICD-9-CM   1. Knee strain, left, initial encounter S86.912A 844.9       Patient Active Problem List   Diagnosis   • Chronic pain of left knee   • History of fracture of patella        Past Medical History:   Diagnosis Date   • Acid reflux    • Anxiety    • Arthritis    • Depression    • Disease of thyroid gland    • Tuberculosis         Past Surgical History:   Procedure Laterality Date   • HARDWARE REMOVAL Left 2017    Procedure: HARDWARE REMOVAL LEFT KNEE                                                  (4-0 cannulated screws two and two free pieces of cerclage wire);  Surgeon: Paul Biggs MD;  Location: [facility];  Service:    • HYSTERECTOMY     • KNEE SURGERY Left     hardware placed secondary to trauma       PT Ortho     Row Name 20       Precautions and Contraindications    Precautions/Limitations  no known precautions/limitations  -       Posture/Observations    Posture/Observations Comments  Slow Gait w/ B crutches, decreased WB through L LE; Double upright hinge brace L  -      User Key  (r) = Recorded By, (t) = Taken By, (c) = Cosigned By    Initials Name Provider Type    Marcia Baker PTA Physical Therapy Assistant                      PT Assessment/Plan     Row Name 20          PT Assessment    Assessment Comments  continues to exhibit good knee ROM and strength but continues with pain in WB; Shown today how to use just one crutch for home use and to progress no crutches with brace as able.   -        PT Plan    PT Frequency  2x/week;3x/week Comp approved 3x/wk for 4 weeks (12  "visits)  -KH     Predicted Duration of Therapy Intervention (Therapy Eval)  6 weeks  -KH     PT Plan Comments  MD note next visit. Continue to focus on pain relief, edema control, strength and ROM as able;   -KH       User Key  (r) = Recorded By, (t) = Taken By, (c) = Cosigned By    Initials Name Provider Type    Marcia Baker PTA Physical Therapy Assistant          Modalities     Row Name 01/23/20 0700             Ice    Ice Applied  Yes  -KH      Location  L knee w/ elevation & IFC  -KH      Rx Minutes  15 mins  -KH      Ice S/P Rx  Yes  -KH         ELECTRICAL STIMULATION    Attended/Unattended  Unattended  -      Stimulation Type  IFC Low  -KH      Location/Electrode Placement/Other  L knee with elevation & ICE  -KH       PT E-Stim Unattended (Manual) Minutes  15  -KH        User Key  (r) = Recorded By, (t) = Taken By, (c) = Cosigned By    Initials Name Provider Type    Marcia Baker PTA Physical Therapy Assistant        OP Exercises     Row Name 01/23/20 0700             Subjective Comments    Subjective Comments  Bigggest issue continues to be when she goes without her brace when trying to walk. Woke up feeling pretty good this morning, attempted to walk to restroom without brace and immediately knows she has to put it back on .   -KH         Subjective Pain    Able to rate subjective pain?  yes  -KH      Pre-Treatment Pain Level  1  -KH      Post-Treatment Pain Level  1  -KH         Exercise 1    Exercise Name 1  pro ll LE ROM  -KH      Cueing 1  Verbal  -KH      Time 1  8'  -KH      Additional Comments  level 1; seat 10  -KH         Exercise 2    Exercise Name 2  quad sets   -KH      Cueing 2  Verbal  -KH      Sets 2  3  -KH      Reps 2  10  -KH      Time 2  5\"   -KH         Exercise 3    Exercise Name 3  SLR flexion  -KH      Cueing 3  Verbal  -KH      Sets 3  2  -KH      Reps 3  10  -KH         Exercise 4    Exercise Name 4  SAQ w/ large bolster  -KH      Cueing 4  Verbal  -KH      Sets 4  2  -KH "      Reps 4  10  -KH         Exercise 5    Exercise Name 5  SL Hip abd  -KH      Cueing 5  Verbal  -KH      Sets 5  2  -KH      Reps 5  10  -KH         Exercise 6    Exercise Name 6  Prone Hip ext  -KH      Cueing 6  Verbal  -KH      Sets 6  2  -KH      Reps 6  10  -KH         Exercise 7    Exercise Name 7  Bridges  -KH      Cueing 7  Verbal  -KH      Sets 7  3  -KH      Reps 7  10  -KH        User Key  (r) = Recorded By, (t) = Taken By, (c) = Cosigned By    Initials Name Provider Type    Marcia Baker PTA Physical Therapy Assistant                       PT OP Goals     Row Name 01/23/20 0700          PT Short Term Goals    STG Date to Achieve  02/05/20  -KH     STG 1  Patient will be independent with HEP.   -KH     STG 1 Progress  Ongoing  -KH     STG 2  Pateint will exhibit left knee ROM equal to right.   -     STG 2 Progress  Progressing  -KH        Long Term Goals    LTG Date to Achieve  02/26/20  -KH     LTG 1  Patient will score 40 on LEFS.   -     LTG 1 Progress  Progressing  -KH     LTG 2  Patient will be able to perform 20 consecutive sit to stand without UE support and without an increase in symptoms.   -KH     LTG 2 Progress  Progressing  -KH     LTG 3  Patient will be able to ambulate 2 blocks without AD and with good gait mechanics.   -     LTG 3 Progress  Progressing  -KH        Time Calculation    PT Goal Re-Cert Due Date  02/05/20  -       User Key  (r) = Recorded By, (t) = Taken By, (c) = Cosigned By    Initials Name Provider Type    Marcia Baker PTA Physical Therapy Assistant                         Time Calculation:   Start Time: 0728  Stop Time: 0825  Time Calculation (min): 57 min  Total Timed Code Minutes- PT: 43 minute(s)  Therapy Charges for Today     Code Description Service Date Service Provider Modifiers Qty    55490310573 HC PT THER SUPP EA 15 MIN 1/23/2020 Marcia Rios PTA GP 1    17964801412 HC PT ELECTRICAL STIM UNATTENDED 1/23/2020 Marcia Rios PTA  1    19823367499 HC PT  THER PROC EA 15 MIN 1/23/2020 Marcia Rios, PTA GP 3                    Marcia Rios, PTA  1/23/2020

## 2020-01-24 ENCOUNTER — HOSPITAL ENCOUNTER (OUTPATIENT)
Dept: PHYSICAL THERAPY | Facility: HOSPITAL | Age: 65
Setting detail: THERAPIES SERIES
Discharge: HOME | End: 2020-01-24

## 2020-01-24 DIAGNOSIS — S86.912A KNEE STRAIN, LEFT, INITIAL ENCOUNTER: Primary | ICD-10-CM

## 2020-01-24 PROCEDURE — 97110 THERAPEUTIC EXERCISES: CPT

## 2020-01-24 PROCEDURE — G0283 ELEC STIM OTHER THAN WOUND: HCPCS

## 2020-01-24 NOTE — THERAPY TREATMENT NOTE
Outpatient Physical Therapy Ortho Treatment Note  [facility]     Patient Name: Neda Ignacio  : 1955  MRN: [id number]  Today's Date: 2020      Visit Date: 2020     Subjective Improvement: 0%  Attendance:  4/4 (3x/wk for 4 weeks, 12 visits)  Next MD Visit : TBD  Recert Date:  2020        Therapy Diagnosis:  Left Knee Strain     Visit Dx:    ICD-10-CM ICD-9-CM   1. Knee strain, left, initial encounter S86.912A 844.9       Patient Active Problem List   Diagnosis   • Chronic pain of left knee   • History of fracture of patella        Past Medical History:   Diagnosis Date   • Acid reflux    • Anxiety    • Arthritis    • Depression    • Disease of thyroid gland    • Tuberculosis         Past Surgical History:   Procedure Laterality Date   • HARDWARE REMOVAL Left 2017    Procedure: HARDWARE REMOVAL LEFT KNEE                                                  (4-0 cannulated screws two and two free pieces of cerclage wire);  Surgeon: Paul Biggs MD;  Location: [facility];  Service:    • HYSTERECTOMY     • KNEE SURGERY Left     hardware placed secondary to trauma       PT Ortho     Row Name 20       Precautions and Contraindications    Precautions/Limitations  no known precautions/limitations  -       Posture/Observations    Posture/Observations Comments  Gait with B crutches, DUHB brace on L knee; slow step through gait; All WB exercises done with brace on.   -      User Key  (r) = Recorded By, (t) = Taken By, (c) = Cosigned By    Initials Name Provider Type    Marcia Baker PTA Physical Therapy Assistant                      PT Assessment/Plan     Row Name 20          PT Assessment    Assessment Comments  pt was shown, educated and demonstrated gait with one crutch; Pt just not sure about it. continues to  have pain and stiffness with knee flexion, but no pain post treatment. Did well with WB exercises only a little bit of discomfort in the knee;  "Swelling increased on lateral knee.    -        PT Plan    PT Frequency  2x/week;3x/week 3x/wk for 4 wks per comp  -KH     Predicted Duration of Therapy Intervention (Therapy Eval)  6 weeks  -     PT Plan Comments  MD note next; continue to increase WB and gait as able;   -KH       User Key  (r) = Recorded By, (t) = Taken By, (c) = Cosigned By    Initials Name Provider Type    Marcia Baker PTA Physical Therapy Assistant          Modalities     Row Name 01/24/20 0700             Ice    Ice Applied  Yes  -      Location  L knee w/ elevation & IFC  -KH      Rx Minutes  15 mins  -KH      Ice S/P Rx  Yes  -KH         ELECTRICAL STIMULATION    Attended/Unattended  Unattended  -      Stimulation Type  IFC Low  -KH      Location/Electrode Placement/Other  L knee with elevation & ICE  -KH       PT E-Stim Unattended (Manual) Minutes  15  -KH        User Key  (r) = Recorded By, (t) = Taken By, (c) = Cosigned By    Initials Name Provider Type    Marcia Baker PTA Physical Therapy Assistant        OP Exercises     Row Name 01/24/20 0700             Subjective Comments    Subjective Comments  pt reports that she had a little bit of increased pain last night; Tried to do some walking at home without crutches but does make it hurt so went back to the crutches; Pt reports that the knee just always feel better with brace on.   -         Subjective Pain    Able to rate subjective pain?  yes  -KH      Pre-Treatment Pain Level  2 First arising this am 5/10   -KH      Post-Treatment Pain Level  0  -KH         Total Minutes    72533 - PT Therapeutic Exercise Minutes  53  -KH         Exercise 1    Exercise Name 1  pro ll LE ROM Brace on L   -KH      Cueing 1  Verbal  -KH      Time 1  10  -KH      Additional Comments  level 1.5; seat 10   -KH         Exercise 2    Exercise Name 2  incline stretch  -KH      Cueing 2  Verbal  -KH      Sets 2  3  -KH      Time 2  30\"  -KH         Exercise 3    Exercise Name 3  standing " "hamstring stretch  -KH      Cueing 3  Verbal  -KH      Sets 3  3  -KH      Time 3  30\"  -KH         Exercise 4    Exercise Name 4  CR/TR  -KH      Cueing 4  Verbal  -KH      Sets 4  2  -KH      Reps 4  10  -KH         Exercise 5    Exercise Name 5  Step ups fwd  -KH      Cueing 5  Verbal  -KH      Sets 5  1  -KH      Reps 5  10  -KH      Additional Comments  4\"  -KH         Exercise 6    Exercise Name 6  quad sets   -KH      Cueing 6  Verbal  -KH      Sets 6  2  -KH      Reps 6  10  -KH         Exercise 7    Exercise Name 7  SLR FF  -KH      Cueing 7  Verbal  -KH      Sets 7  3  -KH      Reps 7  10  -KH         Exercise 8    Exercise Name 8  Clam shells  -KH      Cueing 8  Verbal  -KH      Sets 8  2  -KH      Reps 8  10  -KH         Exercise 9    Exercise Name 9  Bridges  -KH      Cueing 9  Verbal  -KH      Sets 9  2  -KH      Reps 9  10  -KH         Exercise 10    Exercise Name 10  gait w/ one crutch  -KH      Additional Comments  100 ft, slow step through but able   -KH        User Key  (r) = Recorded By, (t) = Taken By, (c) = Cosigned By    Initials Name Provider Type    Marcia Baker, PTA Physical Therapy Assistant                       PT OP Goals     Row Name 01/24/20 0700          PT Short Term Goals    STG Date to Achieve  02/05/20  -     STG 1  Patient will be independent with HEP.   -     STG 1 Progress  Ongoing  -     STG 2  Pateint will exhibit left knee ROM equal to right.   -     STG 2 Progress  Progressing  -        Long Term Goals    LTG Date to Achieve  02/26/20  -     LTG 1  Patient will score 40 on LEFS.   -     LTG 1 Progress  Progressing  -     LTG 2  Patient will be able to perform 20 consecutive sit to stand without UE support and without an increase in symptoms.   -     LTG 2 Progress  Progressing  -     LTG 3  Patient will be able to ambulate 2 blocks without AD and with good gait mechanics.   -     LTG 3 Progress  Progressing  -        Time Calculation    PT Goal " Re-Cert Due Date  02/05/20  -MAYELA       User Key  (r) = Recorded By, (t) = Taken By, (c) = Cosigned By    Initials Name Provider Type    Marcia Baker PTA Physical Therapy Assistant                         Time Calculation:   Start Time: 0722  Stop Time: 0830  Time Calculation (min): 68 min  Total Timed Code Minutes- PT: 53 minute(s)  Therapy Charges for Today     Code Description Service Date Service Provider Modifiers Qty    65693824021 HC PT THER PROC EA 15 MIN 1/24/2020 Marcia Rios PTA GP 4    07355672375 HC PT ELECTRICAL STIM UNATTENDED 1/24/2020 Marcia Rios PTA  1    26878553126 HC PT THER SUPP EA 15 MIN 1/24/2020 Marcia Rios PTA GP 1                    Marcia Rios PTA  1/24/2020

## 2020-01-27 ENCOUNTER — HOSPITAL ENCOUNTER (OUTPATIENT)
Dept: PHYSICAL THERAPY | Facility: HOSPITAL | Age: 65
Setting detail: THERAPIES SERIES
Discharge: HOME | End: 2020-01-27

## 2020-01-27 DIAGNOSIS — S86.912A KNEE STRAIN, LEFT, INITIAL ENCOUNTER: Primary | ICD-10-CM

## 2020-01-27 PROCEDURE — 97110 THERAPEUTIC EXERCISES: CPT

## 2020-01-27 PROCEDURE — G0283 ELEC STIM OTHER THAN WOUND: HCPCS

## 2020-01-27 NOTE — THERAPY TREATMENT NOTE
Outpatient Physical Therapy Ortho Treatment Note  [facility]     Patient Name: Neda Ignacio  : 1955  MRN: [id number]  Today's Date: 2020      Visit Date: 2020     Subjective Improvement: 0%  Attendance:  5/5 (3x/wk for 4 weeks, 12 visits)  Next MD Visit : 2020  Recert Date:  2020        Therapy Diagnosis:  Left Knee Strain     Visit Dx:    ICD-10-CM ICD-9-CM   1. Knee strain, left, initial encounter S86.912A 844.9       Patient Active Problem List   Diagnosis   • Chronic pain of left knee   • History of fracture of patella        Past Medical History:   Diagnosis Date   • Acid reflux    • Anxiety    • Arthritis    • Depression    • Disease of thyroid gland    • Tuberculosis         Past Surgical History:   Procedure Laterality Date   • HARDWARE REMOVAL Left 2017    Procedure: HARDWARE REMOVAL LEFT KNEE                                                  (4-0 cannulated screws two and two free pieces of cerclage wire);  Surgeon: Paul Biggs MD;  Location: [facility];  Service:    • HYSTERECTOMY     • KNEE SURGERY Left     hardware placed secondary to trauma       PT Ortho     Row Name 20 0700       Subjective Comments    Subjective Comments  pt reports to PT with B crutches and DUHB on L; States that over the weekend she was able. to walk some without the crutches but knee still feels like it is going to buckle on her; Reports that the knee still hurts when she doesn't have the brace on upon first getting up in the morning; Sees MD today at 1530; Still states that the knee pops with certain things;   -KH       Precautions and Contraindications    Precautions/Limitations  no known precautions/limitations  -KH       Subjective Pain    Post-Treatment Pain Level  0  -KH       Posture/Observations    Posture/Observations Comments  gait with B crutches into clinic; 100ft gait without crutch, slow and antalgic but able. DUHB on L knee with WB  -KH       Left Lower  Ext    Lt Knee Extension/Flexion AROM  0-125 w/o pain  -KH       MMT Left Lower Ext    Lt Hip Flexion MMT, Gross Movement  (5/5) normal  -KH    Lt Knee Extension MMT, Gross Movement  (5/5) normal  -KH    Lt Knee Flexion MMT, Gross Movement  (4/5) good  -KH      User Key  (r) = Recorded By, (t) = Taken By, (c) = Cosigned By    Initials Name Provider Type    Marcia Baker PTA Physical Therapy Assistant                      PT Assessment/Plan     Row Name 01/27/20 0700          PT Assessment    Assessment Comments  MD notes faxed this date; pt instructed to only use one crutch, demonstrated well gait with just one crutch; Educated pt that if pain increases with just one to go back to 2 but has good zia and stride with one crutch; continues with swelling in the L knee (pocket edema L lateral patella) did have some pain in the knee with mini squats and step ups.   -        PT Plan    PT Frequency  2x/week;3x/week  -     Predicted Duration of Therapy Intervention (Therapy Eval)  6 weeks 3x/wk for 4 weeks per comp  -     PT Plan Comments  Will continue as MD advises; progress WB, lateral step ups and TKE's next; prone ham curls.  -       User Key  (r) = Recorded By, (t) = Taken By, (c) = Cosigned By    Initials Name Provider Type    Marcia Baker PTA Physical Therapy Assistant          Modalities     Row Name 01/27/20 0700             Ice    Ice Applied  Yes  -KH      Location  L knee w/ elevation & IFC  -KH      Rx Minutes  15 mins  -KH      Ice S/P Rx  Yes  -KH         ELECTRICAL STIMULATION    Attended/Unattended  Unattended  -      Stimulation Type  IFC Low  -KH      Location/Electrode Placement/Other  L knee with elevation & ICE  -KH       PT E-Stim Unattended (Manual) Minutes  15  -KH        User Key  (r) = Recorded By, (t) = Taken By, (c) = Cosigned By    Initials Name Provider Type    Marcia Baker PTA Physical Therapy Assistant        OP Exercises     Row Name 01/27/20 0700              "Subjective Comments    Subjective Comments  pt reports to PT with B crutches and DUHB on L; States that over the weekend she was able. to walk some without the crutches but knee still feels like it is going to buckle on her; Reports that the knee still hurts when she doesn't have the brace on upon first getting up in the morning; Sees MD today at 1530; Still states that the knee pops with certain things;   -KH         Subjective Pain    Able to rate subjective pain?  yes  -KH      Pre-Treatment Pain Level  2 4/10 when she first gets up or until she puts brace on.  -KH      Post-Treatment Pain Level  0  -KH         Total Minutes    29345 - PT Therapeutic Exercise Minutes  46  -KH         Exercise 1    Exercise Name 1  pro ll LE ROM Brace on L   -KH      Cueing 1  Verbal  -KH      Time 1  10  -KH      Additional Comments  level 3; seat 10  popping noted with Rotation on the L knee.   -KH         Exercise 2    Exercise Name 2  incline stretch  -KH      Cueing 2  Verbal  -KH      Sets 2  3  -KH      Time 2  30\"  -KH         Exercise 3    Exercise Name 3  standing hamstring stretch  -KH      Cueing 3  Verbal  -KH      Sets 3  3  -KH      Time 3  30\"  -KH         Exercise 4    Exercise Name 4  CR/TR  -KH      Cueing 4  Verbal  -KH      Sets 4  2  -KH      Reps 4  10  -KH         Exercise 5    Exercise Name 5  Step ups fwd  -KH      Cueing 5  Verbal  -KH      Sets 5  1  -KH      Reps 5  10  -KH         Exercise 6    Exercise Name 6  Mini Squats  -KH      Cueing 6  Verbal  -KH      Sets 6  2  -KH      Reps 6  10  -KH      Additional Comments  pain with these; needed VC to perform correctly   -KH         Exercise 7    Exercise Name 7  Gait with 1 Crutch  -KH      Additional Comments  150ft  -KH         Exercise 8    Exercise Name 8  Patella Mobes  -KH      Cueing 8  Verbal  -KH      Time 8  5'  -KH         Exercise 9    Exercise Name 9  SAQ over large bolster  -KH      Cueing 9  Verbal  -KH      Sets 9  3  -KH      Reps 9  " 10  -      Additional Comments  2# aw  -        User Key  (r) = Recorded By, (t) = Taken By, (c) = Cosigned By    Initials Name Provider Type    Marcia Baker PTA Physical Therapy Assistant                       PT OP Goals     Row Name 01/27/20 0700          PT Short Term Goals    STG Date to Achieve  02/05/20  -     STG 1  Patient will be independent with HEP.   -     STG 1 Progress  Ongoing  -     STG 2  Pateint will exhibit left knee ROM equal to right.   -     STG 2 Progress  Progressing  -        Long Term Goals    LTG Date to Achieve  02/26/20  -     LTG 1  Patient will score 40 on LEFS.   -     LTG 1 Progress  Progressing  -     LTG 2  Patient will be able to perform 20 consecutive sit to stand without UE support and without an increase in symptoms.   -     LTG 2 Progress  Progressing  -     LTG 3  Patient will be able to ambulate 2 blocks without AD and with good gait mechanics.   -     LTG 3 Progress  Progressing  -        Time Calculation    PT Goal Re-Cert Due Date  02/05/20  -MAYELA       User Key  (r) = Recorded By, (t) = Taken By, (c) = Cosigned By    Initials Name Provider Type    Marcia Baker PTA Physical Therapy Assistant                         Time Calculation:   Start Time: 0722  Stop Time: 0821  Time Calculation (min): 59 min  Total Timed Code Minutes- PT: 46 minute(s)  Therapy Charges for Today     Code Description Service Date Service Provider Modifiers Qty    32939267318 HC PT THER PROC EA 15 MIN 1/27/2020 Marcia Rios PTA GP 3    52241846573 HC PT ELECTRICAL STIM UNATTENDED 1/27/2020 Marcia Rios PTA  1    15672201481 HC PT THER SUPP EA 15 MIN 1/27/2020 Marcia Rios PTA GP 1                    Marcia Rios PTA  1/27/2020

## 2020-01-28 ENCOUNTER — HOSPITAL ENCOUNTER (OUTPATIENT)
Dept: PHYSICAL THERAPY | Facility: HOSPITAL | Age: 65
Setting detail: THERAPIES SERIES
Discharge: HOME | End: 2020-01-28

## 2020-01-28 DIAGNOSIS — S86.912A KNEE STRAIN, LEFT, INITIAL ENCOUNTER: Primary | ICD-10-CM

## 2020-01-28 PROCEDURE — G0283 ELEC STIM OTHER THAN WOUND: HCPCS

## 2020-01-28 PROCEDURE — 97110 THERAPEUTIC EXERCISES: CPT

## 2020-01-28 NOTE — THERAPY TREATMENT NOTE
Outpatient Physical Therapy Ortho Treatment Note  [facility]     Patient Name: Neda Ignacio  : 1955  MRN: [id number]  Today's Date: 2020      Visit Date: 2020     Subjective Improvement: 0%  Attendance:   (3x/wk for 4 weeks, 12 visits)  Next MD Visit : 2020  Recert Date:  2020        Therapy Diagnosis:  Left Knee Strain     Visit Dx:    ICD-10-CM ICD-9-CM   1. Knee strain, left, initial encounter S86.912A 844.9       Patient Active Problem List   Diagnosis   • Chronic pain of left knee   • History of fracture of patella        Past Medical History:   Diagnosis Date   • Acid reflux    • Anxiety    • Arthritis    • Depression    • Disease of thyroid gland    • Tuberculosis         Past Surgical History:   Procedure Laterality Date   • HARDWARE REMOVAL Left 2017    Procedure: HARDWARE REMOVAL LEFT KNEE                                                  (4-0 cannulated screws two and two free pieces of cerclage wire);  Surgeon: Paul Biggs MD;  Location: [facility];  Service:    • HYSTERECTOMY     • KNEE SURGERY Left     hardware placed secondary to trauma       PT Ortho     Row Name 20 0700       Precautions and Contraindications    Precautions/Limitations  no known precautions/limitations  -       Posture/Observations    Posture/Observations Comments  DUHB on L with WB; Slow gait with one crutch  -      User Key  (r) = Recorded By, (t) = Taken By, (c) = Cosigned By    Initials Name Provider Type    Marcia Baker PTA Physical Therapy Assistant                      PT Assessment/Plan     Row Name 20 0700          PT Assessment    Assessment Comments  pain in medial knee and was unable to perform lateral step ups; had some discomfort with LQ as well. Gait improving   -        PT Plan    PT Frequency  2x/week;3x/week  -     Predicted Duration of Therapy Intervention (Therapy Eval)  6 weeks 3x/wk for 4 weeks  -     PT Plan Comments  Attempt  "lateral step ups again and TKE's with tband  -KH       User Key  (r) = Recorded By, (t) = Taken By, (c) = Cosigned By    Initials Name Provider Type    Marcia Baker PTA Physical Therapy Assistant          Modalities     Row Name 01/28/20 0700             Ice    Ice Applied  Yes  -KH      Location  L knee w/ elevation & IFC  -KH      Rx Minutes  15 mins  -KH      Ice S/P Rx  Yes  -KH         ELECTRICAL STIMULATION    Attended/Unattended  Unattended  -      Stimulation Type  IFC Low  -KH      Location/Electrode Placement/Other  L knee with elevation & ICE  -KH       PT E-Stim Unattended (Manual) Minutes  15  -KH        User Key  (r) = Recorded By, (t) = Taken By, (c) = Cosigned By    Initials Name Provider Type    Marcia Baker PTA Physical Therapy Assistant        OP Exercises     Row Name 01/28/20 0700             Subjective Comments    Subjective Comments  MD kept her on sit down duty at work; Reports that the x-rays show that she has some narrowing in the jt space in the knee. Wants her to continues using one crutch and the brace and to wean off of the crutch as needed and then wean off brace;  To return to see him on 02/04/2020, at that time they may decided to do \"scraping\" ?? or injections. ; Reports that yesterday evening when she got home it didn't hurt but attempted to try and walk without the brace again this morning. and states that the pain returns and feels like the knee is going to buckle.   -KH         Subjective Pain    Able to rate subjective pain?  yes  -KH      Pre-Treatment Pain Level  2  -KH      Post-Treatment Pain Level  0  -KH         Total Minutes    21225 - PT Therapeutic Exercise Minutes  42  -KH         Exercise 1    Exercise Name 1  pro ll LE ROM Brace on L   -KH      Cueing 1  Verbal  -KH      Time 1  10  -KH      Additional Comments  level 2.5; seat 10  -KH         Exercise 2    Exercise Name 2  incline stretch  -KH      Cueing 2  Verbal  -KH      Sets 2  3  -KH      Time 2  " "30\"  -KH         Exercise 3    Exercise Name 3  standing hamstring stretch  -KH      Cueing 3  Verbal  -KH      Sets 3  3  -KH      Time 3  30\"  -KH         Exercise 4    Exercise Name 4  step ups fwd  -KH      Cueing 4  Verbal  -KH      Sets 4  2  -KH      Reps 4  10  -KH      Additional Comments  4\" step  -KH         Exercise 5    Exercise Name 5  step ups lateral  -KH      Cueing 5  Verbal  -KH      Sets 5  --  -KH      Reps 5  2  -KH      Additional Comments  4\" step; unable to complete secondary to pain in the medial knee.   -KH         Exercise 6    Exercise Name 6  CR/TR  -KH      Cueing 6  Verbal  -KH      Sets 6  2  -KH      Reps 6  10  -KH         Exercise 7    Exercise Name 7  LAQ  w/ add  -KH      Cueing 7  Verbal  -KH      Sets 7  2  -KH      Reps 7  10  -KH      Additional Comments  pain medially with first 2 reps   -KH         Exercise 8    Exercise Name 8  Ham Curls with tband  -KH      Cueing 8  Verbal  -KH      Sets 8  2  -KH      Reps 8  10  -KH      Additional Comments  green  -KH         Exercise 9    Exercise Name 9  seated co-contraction into physioball  -KH      Cueing 9  Verbal  -KH      Sets 9  2  -KH      Reps 9  10  -KH        User Key  (r) = Recorded By, (t) = Taken By, (c) = Cosigned By    Initials Name Provider Type    Marcia Baker, PTA Physical Therapy Assistant                       PT OP Goals     Row Name 01/28/20 0700          PT Short Term Goals    STG Date to Achieve  02/05/20  -KH     STG 1  Patient will be independent with HEP.   -KH     STG 1 Progress  Ongoing  -KH     STG 2  Pateint will exhibit left knee ROM equal to right.   -KH     STG 2 Progress  Progressing  -KH        Long Term Goals    LTG Date to Achieve  02/26/20  -KH     LTG 1  Patient will score 40 on LEFS.   -KH     LTG 1 Progress  Progressing  -KH     LTG 2  Patient will be able to perform 20 consecutive sit to stand without UE support and without an increase in symptoms.   -KH     LTG 2 Progress  Progressing  " -     LTG 3  Patient will be able to ambulate 2 blocks without AD and with good gait mechanics.   -     LTG 3 Progress  Progressing  -        Time Calculation    PT Goal Re-Cert Due Date  02/05/20  -MAYELA       User Key  (r) = Recorded By, (t) = Taken By, (c) = Cosigned By    Initials Name Provider Type    Marcia Baker PTA Physical Therapy Assistant                         Time Calculation:   Start Time: 0721  Stop Time: 0818  Time Calculation (min): 57 min  Total Timed Code Minutes- PT: 42 minute(s)  Therapy Charges for Today     Code Description Service Date Service Provider Modifiers Qty    24840221625 HC PT THER PROC EA 15 MIN 1/28/2020 Marcia Rios PTA GP 3    98992374583 HC PT ELECTRICAL STIM UNATTENDED 1/28/2020 Marcia Rios PTA  1    94880175911 HC PT THER SUPP EA 15 MIN 1/28/2020 Marcia Rios PTA GP 1                    Marcia Rios PTA  1/28/2020

## 2020-01-30 ENCOUNTER — HOSPITAL ENCOUNTER (OUTPATIENT)
Dept: PHYSICAL THERAPY | Facility: HOSPITAL | Age: 65
Setting detail: THERAPIES SERIES
Discharge: HOME | End: 2020-01-30

## 2020-01-30 ENCOUNTER — OFFICE VISIT (OUTPATIENT)
Dept: CARDIOLOGY | Facility: CLINIC | Age: 65
End: 2020-01-30

## 2020-01-30 VITALS
BODY MASS INDEX: 28.73 KG/M2 | OXYGEN SATURATION: 96 % | WEIGHT: 178.8 LBS | HEIGHT: 66 IN | DIASTOLIC BLOOD PRESSURE: 80 MMHG | HEART RATE: 80 BPM | SYSTOLIC BLOOD PRESSURE: 122 MMHG

## 2020-01-30 DIAGNOSIS — R07.89 CHEST PAIN, ATYPICAL: Primary | ICD-10-CM

## 2020-01-30 DIAGNOSIS — F41.9 ANXIETY: ICD-10-CM

## 2020-01-30 DIAGNOSIS — S86.912A KNEE STRAIN, LEFT, INITIAL ENCOUNTER: Primary | ICD-10-CM

## 2020-01-30 DIAGNOSIS — F33.9 RECURRENT MAJOR DEPRESSIVE DISORDER, REMISSION STATUS UNSPECIFIED (HCC): ICD-10-CM

## 2020-01-30 PROCEDURE — 93000 ELECTROCARDIOGRAM COMPLETE: CPT | Performed by: INTERNAL MEDICINE

## 2020-01-30 PROCEDURE — G0283 ELEC STIM OTHER THAN WOUND: HCPCS

## 2020-01-30 PROCEDURE — 99214 OFFICE O/P EST MOD 30 MIN: CPT | Performed by: NURSE PRACTITIONER

## 2020-01-30 PROCEDURE — 97110 THERAPEUTIC EXERCISES: CPT

## 2020-01-30 RX ORDER — BUSPIRONE HYDROCHLORIDE 10 MG/1
10 TABLET ORAL 3 TIMES DAILY
Qty: 90 TABLET | Refills: 5 | Status: SHIPPED | OUTPATIENT
Start: 2020-01-30 | End: 2020-07-07

## 2020-01-30 NOTE — THERAPY TREATMENT NOTE
Outpatient Physical Therapy Ortho Treatment Note  [facility]     Patient Name: Neda Ignacio  : 1955  MRN: [id number]  Today's Date: 2020      Visit Date: 2020     Subjective Improvement: 0%  Attendance:   (3x/wk for 4 weeks, 12 visits)  Next MD Visit : 2020  Recert Date:  2020        Therapy Diagnosis:  Left Knee Strain     Visit Dx:    ICD-10-CM ICD-9-CM   1. Knee strain, left, initial encounter S86.912A 844.9       Patient Active Problem List   Diagnosis   • Chronic pain of left knee   • History of fracture of patella        Past Medical History:   Diagnosis Date   • Acid reflux    • Anxiety    • Arthritis    • Depression    • Disease of thyroid gland    • Tuberculosis         Past Surgical History:   Procedure Laterality Date   • HARDWARE REMOVAL Left 2017    Procedure: HARDWARE REMOVAL LEFT KNEE                                                  (4-0 cannulated screws two and two free pieces of cerclage wire);  Surgeon: Paul Biggs MD;  Location: [facility];  Service:    • HYSTERECTOMY     • KNEE SURGERY Left     hardware placed secondary to trauma       PT Ortho     Row Name 20 07       Precautions and Contraindications    Precautions/Limitations  no known precautions/limitations  -       Posture/Observations    Posture/Observations Comments  DUHB on L with WB; Slow gait with one crutch; edema noted latral jt line and inferior patalla region;   -      User Key  (r) = Recorded By, (t) = Taken By, (c) = Cosigned By    Initials Name Provider Type    Marcia Baker PTA Physical Therapy Assistant                      PT Assessment/Plan     Row Name 20 0700          PT Assessment    Assessment Comments  most pain reported with exercise comes from under the knee cap per pt report;  Objectively pt is improving but did have some increased swelling noted this date; Education given to pt to ice at least once a day and progressing of walking with  "and without braces and crutches and correct way to progress; Pt verbalized understanding of the instructions given.   -        PT Plan    PT Frequency  2x/week;3x/week  -     Predicted Duration of Therapy Intervention (Therapy Eval)  6 weeks 3x/wk fro 4 wks is what comp has approved at this time.   -     PT Plan Comments  Progress as able with strength and ROM; Monitor swelling and progress gait as able.   -       User Key  (r) = Recorded By, (t) = Taken By, (c) = Cosigned By    Initials Name Provider Type    Marcia Baker PTA Physical Therapy Assistant          Modalities     Row Name 01/30/20 0700             Ice    Ice Applied  Yes  -      Location  L knee w/ elevation & IFC  -KH      Rx Minutes  15 mins  -      Ice S/P Rx  Yes  -         ELECTRICAL STIMULATION    Attended/Unattended  Unattended  -      Stimulation Type  IFC Low  -KH      Location/Electrode Placement/Other  L knee with elevation & ICE  -KH       PT E-Stim Unattended (Manual) Minutes  15  -KH        User Key  (r) = Recorded By, (t) = Taken By, (c) = Cosigned By    Initials Name Provider Type    Marcia Baker PTA Physical Therapy Assistant        OP Exercises     Row Name 01/30/20 0700             Subjective Comments    Subjective Comments  Pt reports that she is getting fustrated because she still feels like her knee has made no improvements. \"It hurts a little at nighttime, it wakes me up some in the middle of the night, when I Try and get up and walk on it thinking that it is better without the brace the knee starts to hurt worse with every step so she has to put the brace back on\"  She reports that while she is sitting at work she doesn't have any issues but when she gets up and walks the knee starts to get stiff and painful and she feels like it is swelling.  Pt reports that when she trys to walk at home without the brace that she is also walking without the crutch as well but has not tried to go with the brace and no " "crutches yet.   -KH         Subjective Pain    Able to rate subjective pain?  yes  -KH      Pre-Treatment Pain Level  3 \"I can feel it pulling in the back of the knee this morning\"  -KH      Post-Treatment Pain Level  0  -KH         Total Minutes    97230 - PT Therapeutic Exercise Minutes  42  -KH         Exercise 1    Exercise Name 1  pro ll LE ROM Brace on L   -KH      Cueing 1  Verbal  -KH      Time 1  10  -KH      Additional Comments  level 3; seat 10   -KH         Exercise 2    Exercise Name 2  incline stretch  -KH      Cueing 2  Verbal  -KH      Sets 2  3  -KH      Time 2  30\"  -KH         Exercise 3    Exercise Name 3  Lunge Stretch at step for knee flexion  -KH      Cueing 3  Verbal  -KH      Sets 3  1  -KH      Reps 3  10  -KH      Additional Comments  \"It feels stretchy, like something is pulling\"  -KH         Exercise 4    Exercise Name 4  step ups fwd  -KH      Cueing 4  Verbal  -KH      Sets 4  2  -KH      Reps 4  10  -KH      Additional Comments  4\" step (\"I could probably not doing anything higher than this cause I can feel it\"  -KH         Exercise 5    Exercise Name 5  step ups lateral  -KH      Cueing 5  Verbal  -KH      Sets 5  1  -KH      Reps 5  10  -KH      Additional Comments  4\" step (\"I am able to it this morning, painful but able and using mostly my arms\"  -KH         Exercise 6    Exercise Name 6  sit to stands w/ the use of UE  -KH      Cueing 6  Verbal  -KH      Sets 6  2  -KH      Reps 6  5  -KH      Additional Comments  \"This hurts my other knee as well\"  -KH         Exercise 7    Exercise Name 7  standing TKE's  -KH      Cueing 7  Verbal  -KH      Sets 7  2  -KH      Reps 7  10  -KH      Additional Comments  w/ ball at wall no increased pn with this exercise  -KH         Exercise 8    Exercise Name 8  SLR  -KH      Cueing 8  Verbal  -KH      Sets 8  3  -KH      Reps 8  10  -KH        User Key  (r) = Recorded By, (t) = Taken By, (c) = Cosigned By    Initials Name Provider Type    KH " Marcia Rios PTA Physical Therapy Assistant                       PT OP Goals     Row Name 01/30/20 0700          PT Short Term Goals    STG Date to Achieve  02/05/20  -MAYELA     STG 1  Patient will be independent with HEP.   -     STG 1 Progress  Ongoing  -     STG 2  Pateint will exhibit left knee ROM equal to right.   -     STG 2 Progress  Progressing  -KH        Long Term Goals    LTG Date to Achieve  02/26/20  -     LTG 1  Patient will score 40 on LEFS.   -KH     LTG 1 Progress  Progressing  -KH     LTG 2  Patient will be able to perform 20 consecutive sit to stand without UE support and without an increase in symptoms.   -     LTG 2 Progress  Progressing  -KH     LTG 3  Patient will be able to ambulate 2 blocks without AD and with good gait mechanics.   -     LTG 3 Progress  Progressing  -        Time Calculation    PT Goal Re-Cert Due Date  02/05/20  -MAYELA       User Key  (r) = Recorded By, (t) = Taken By, (c) = Cosigned By    Initials Name Provider Type    Marcia Baker PTA Physical Therapy Assistant                         Time Calculation:   Start Time: 0723  Stop Time: 0830  Time Calculation (min): 67 min  Total Timed Code Minutes- PT: 42 minute(s)  Therapy Charges for Today     Code Description Service Date Service Provider Modifiers Qty    69477555625 HC PT THER PROC EA 15 MIN 1/30/2020 Marcia Rios PTA GP 3    39500049534 HC PT ELECTRICAL STIM UNATTENDED 1/30/2020 Marcia Rios PTA  1    35049192109 HC PT THER SUPP EA 15 MIN 1/30/2020 Marcia Rios PTA GP 1                    Marcia Rios PTA  1/30/2020

## 2020-01-30 NOTE — PROGRESS NOTES
Chest Pain (chief complaint)      Chest Pain    This is a recurrent problem. The current episode started 1 to 4 weeks ago. The problem occurs intermittently. The problem has been waxing and waning. The pain is present in the substernal region. The pain is at a severity of 3/10. The pain is mild. The quality of the pain is described as dull. The pain does not radiate. Pertinent negatives include no abdominal pain, back pain, cough, dizziness, exertional chest pressure, fever, headaches, irregular heartbeat, leg pain, lower extremity edema, malaise/fatigue, nausea, near-syncope, orthopnea, palpitations, shortness of breath, syncope or vomiting. The pain is aggravated by emotional upset. She has tried rest for the symptoms. The treatment provided mild relief. Risk factors include post-menopausal and stress.   Her past medical history is significant for anxiety/panic attacks, hyperlipidemia and thyroid problem.   Pertinent negatives for past medical history include no aneurysm, no aortic aneurysm, no aortic dissection, no arrhythmia, no CAD, no congenital heart disease, no COPD, no CHF, no diabetes, no DVT, no hypertension, no MI, no PE and no TIA.   Her family medical history is significant for CAD and hypertension.   Pertinent negatives for family medical history include: no aortic dissection, no early MI and no sudden death. Prior workup: No Prior cardiac catheterization, chest x-ray, echocardiogram, TST, stress echo or other diagnostic work-up performed.        Mrs. Ignacio is a 64-year-old  female with past medical history of hypothyroidism (levothyroxine 112mcg), IBS (controlled with Bentyl), OA of multiple joints, allergic rhinitis (controlled with Singulair 10mg and Flonase), depression (currently on Prozac), hyperlipidemia (is currently on Rosuvastatin 5mg).  Was a former smoker 1 pack/day x43 years.  She presented on 1/15/2020 to PCP Dr. Bee's office with multiple complaints of vague chest pain on  and off that is related to activity.  Reports that she has been seen about 10 years ago in a free clinic, had an abnormal EKG and was placed on Plavix at that time.  She did not receive a cardiology evaluation.  The patient has hyperlipidemia with cholesterol 226, .  CMP and CBC were stable.  TSH within normal limits.  EKG was performed in PCPs office found to be normal sinus rhythm with a ventricular rate 78, diminished R waves in V2 through V4.  No acute ST or ST wave changes.  She presents today for a cardiology evaluation.      Chest Pain   Pain location: substernal region  Pain quality: Dull  Pain radiates to: does not radiate  Onset quality: sudden  Duration:  5 mins  Timing: waxing/waning  Progression: Worsening over several months  Chronicity: n/a  Relieved by: being at home, relaxing  Worsened by: anxiety/stress at work. Reports she has a negative work environment and she believes the chest pain is r/t anxiety brought on by work.   Associated symptoms: Heart racing, shortness of breath with anxious    The 10-year ASCVD risk score ([place] JARAD Jr., et al., 2013) is: 4%    Values used to calculate the score:      Age: 64 years      Sex: Female      Is Non- : No      Diabetic: No      Tobacco smoker: No      Systolic Blood Pressure: 122 mmHg      Is BP treated: No      HDL Cholesterol: 52 mg/dL      Total Cholesterol: 216 mg/dL      Cardiac Risk Factors:  hypercholesterolemia  Comments Denies prior cardiac history    Past Medical History:   Diagnosis Date   • Acid reflux    • Anxiety    • Arthritis    • Depression    • Disease of thyroid gland    • Tuberculosis      Past Surgical History:   Procedure Laterality Date   • HARDWARE REMOVAL Left 11/6/2017    Procedure: HARDWARE REMOVAL LEFT KNEE                                                  (4-0 cannulated screws two and two free pieces of cerclage wire);  Surgeon: Paul Biggs MD;  Location: [facility] OR;  Service:    •  HYSTERECTOMY     • KNEE SURGERY Left     hardware placed secondary to trauma     Social History     Socioeconomic History   • Marital status:      Spouse name: Not on file   • Number of children: Not on file   • Years of education: Not on file   • Highest education level: Not on file   Tobacco Use   • Smoking status: Former Smoker     Packs/day: 1.00     Years: 43.00     Pack years: 43.00     Last attempt to quit: 2010     Years since quitting: 10.0   • Smokeless tobacco: Never Used   Substance and Sexual Activity   • Alcohol use: Yes     Comment: very little   • Drug use: No   • Sexual activity: Defer     Family History   Problem Relation Age of Onset   • Heart disease Mother    • Arthritis Mother    • Heart disease Father    • Hypertension Father    • Arthritis Father    • Heart disease Sister    • Hypertension Sister    • Arthritis Sister    • Heart disease Brother    • Hypertension Brother    • Arthritis Brother    • No Known Problems Maternal Grandmother    • No Known Problems Maternal Grandfather    • No Known Problems Paternal Grandmother    • No Known Problems Paternal Grandfather    • Autism Son    • No Known Problems Daughter        ALLERGIES:  Allergies   Allergen Reactions   • Penicillins GI Intolerance         Review of Systems   Constitution: Negative for chills, decreased appetite, fever and malaise/fatigue.   HENT: Negative for congestion, ear pain and tinnitus.    Eyes: Negative for blurred vision and double vision.   Cardiovascular: Positive for chest pain. Negative for dyspnea on exertion, irregular heartbeat, leg swelling, near-syncope, orthopnea, palpitations and syncope.   Respiratory: Negative for cough, shortness of breath and sleep disturbances due to breathing.    Endocrine: Negative for polydipsia, polyphagia and polyuria.   Hematologic/Lymphatic: Negative for bleeding problem. Does not bruise/bleed easily.   Skin: Negative for color change and rash.   Musculoskeletal: Positive for  arthritis and joint pain. Negative for back pain and falls.   Gastrointestinal: Positive for heartburn. Negative for bloating, abdominal pain, nausea and vomiting.   Genitourinary: Negative for dysuria and hematuria.   Neurological: Negative for dizziness, headaches, light-headedness, loss of balance and vertigo.   Psychiatric/Behavioral: Positive for depression. Negative for substance abuse. The patient is nervous/anxious. The patient does not have insomnia.        Current Outpatient Medications   Medication Sig Dispense Refill   • acetaminophen (TYLENOL) 325 MG tablet Take 650 mg by mouth Every 6 (Six) Hours As Needed for Mild Pain .     • aspirin 81 MG EC tablet Take 81 mg by mouth Daily.     • dicyclomine (BENTYL) 20 MG tablet Take 1 tablet by mouth Every 6 (Six) Hours As Needed. 90 tablet 1   • FLUoxetine (PROzac) 20 MG capsule Take 1 capsule by mouth daily. 30 capsule 11   • FLUoxetine (PROzac) 40 MG capsule Take 1 capsule by mouth daily. 90 capsule 3   • fluticasone (FLONASE) 50 MCG/ACT nasal spray Instill 2 sprays into the nostril(s) as directed once daily. 16 g 11   • ibuprofen (ADVIL,MOTRIN) 400 MG tablet Take 1 tablet by mouth 2 (two) times daily with meals. 180 tablet 1   • levothyroxine (SYNTHROID, LEVOTHROID) 112 MCG tablet Take 1 tablet by mouth Every Morning Before Breakfast. 90 tablet 0   • montelukast (SINGULAIR) 10 MG tablet Take 1 tablet by mouth nightly. 30 tablet 11   • raNITIdine (ZANTAC) 300 MG tablet Take 1 tablet by mouth 2 (two) times daily. 60 tablet 5   • rosuvastatin (CRESTOR) 5 MG tablet Take 1 tablet by mouth every evening. 30 tablet 11   • busPIRone (BUSPAR) 10 MG tablet Take 1 tablet by mouth 3 (Three) Times a Day. 90 tablet 5     No current facility-administered medications for this visit.        OBJECTIVE:    Physical Exam:   Physical Exam   Constitutional: She is oriented to person, place, and time. Vital signs are normal. She appears well-developed and well-nourished.  Non-toxic  "appearance. She does not have a sickly appearance. No distress.   Pt walking with a single crutch   HENT:   Head: Normocephalic and atraumatic.   Right Ear: External ear normal.   Left Ear: External ear normal.   Eyes: Conjunctivae and lids are normal.   Neck: Normal range of motion. Neck supple. No JVD present.   Cardiovascular: Normal rate, regular rhythm, S1 normal, S2 normal, normal heart sounds, intact distal pulses and normal pulses. PMI is not displaced. Exam reveals no gallop, no S3, no S4 and no friction rub.   No murmur heard.  Pulmonary/Chest: Effort normal and breath sounds normal. No respiratory distress. She has no decreased breath sounds. She has no wheezes. She has no rales. She exhibits no tenderness.   Abdominal: Soft. Normal appearance and bowel sounds are normal. She exhibits no distension. There is no tenderness.   Musculoskeletal: She exhibits no edema.   Neurological: She is alert and oriented to person, place, and time. Gait normal.   Skin: Skin is warm and dry. No rash noted. She is not diaphoretic. No erythema. No pallor.   Psychiatric: She has a normal mood and affect. Her behavior is normal. Judgment and thought content normal.   Vitals reviewed.    Vitals:    01/30/20 1452   BP: 122/80   BP Location: Left arm   Patient Position: Sitting   Cuff Size: Adult   Pulse: 80   SpO2: 96%   Weight: 81.1 kg (178 lb 12.8 oz)   Height: 167.6 cm (66\")       DATA REVIEWED:        Xr Knee 4+ View Left    Result Date: 1/9/2020  CONCLUSION: No acute fracture or dislocation. Healed patellar fracture. Screws and cerclage wires have been removed. Small patellar, femoral and tibial spurs. Minimal narrowing of the medial joint space. Very small suprapatellar effusion. [id number] Electronically signed by:  Barrie Mi MD  1/9/2020 6:11 PM CST Workstation: [email or web address]    CXR:     CT:     Labs: BMP, CBC, LIPID, TSH  No results found for: GLUCOSE, CALCIUM, NA, K, CO2, CL, BUN, CREATININE, EGFRIFAFRI, EGFRIFNONA, " BCR, ANIONGAP  Lab Results   Component Value Date    WBC 5.8 2018    HGB 14.2 2018    HCT 43.8 2018    MCV 95 2018     2018     Lab Results   Component Value Date    CHOL 216 (H) 2018     Lab Results   Component Value Date    TRIG 104 2018     Lab Results   Component Value Date    HDL 52 2018     No components found for: LDLCALC  Lab Results   Component Value Date     (H) 2018     No results found for: HDLLDLRATIO  No components found for: CHOLHDL  Lab Results   Component Value Date    TSH 7.9 (H) 2019     No results found for: PROBNP  EK2020        TTE:     Stress tests:     [facility]:      The following portions of the patient's history were reviewed and updated as appropriate: allergies, current medications, past family history, past medical history, past social history, past surgical history and problem list.  Old records reviewed and pertinent information is included in the above objective data.     ASSESSMENT/PLAN:       Diagnosis Plan   1. Chest pain, atypical  ECG 12 Lead-repeated in office today. NSR hr 71 with no ST changes.   Chest pain syndrome. Pain characteristic: atypical angina, anxiety induced chest pains   Patient is Low Risk.     Ischemic evaluation:ordered. Due to risk factor stratification, age, hyperlipidemia, family history and symptom onset. Previous smoker, 43 pack years.    The patient is not able to exercise. L knee injury, pt currently on crutches   EKG is Normal  Risks/Benefits discussed and patient agreeable for nuclear stress test   Ischemia evaluation with Nuclear Stress Test  TTE to evaluate for structural heart disease  Patient not agreeable for Holter monitor at this point to exclude arrhthymias. Wants to proceed with Nuclear and TTE.   Basic Labs results reviewed.    Reasons for non-cardiac chest pain:  Pneumonia  GERD  Esophageal Spasm  Musculoskeletal pain  Peptic Ulcer  Disease  Cholecystitis  Pancreatitis  Symptomatic anemia  Vasoactive Drug Use  Anxiety  Mediastinitis     The 10-year ASCVD risk score (Mikaelam[place] ABRAHAM Jr., et al., 2013) is: 4%    Values used to calculate the score:      Age: 64 years      Sex: Female      Is Non- : No      Diabetic: No      Tobacco smoker: No      Systolic Blood Pressure: 122 mmHg      Is BP treated: No      HDL Cholesterol: 52 mg/dL      Total Cholesterol: 216 mg/dL     Neda Ignacio  reports that she quit smoking about 10 years ago. She has a 43.00 pack-year smoking history. She has never used smokeless tobacco.     Patient's Body mass index is 28.86 kg/m². BMI is within normal parameters. No follow-up required..      Continue rosuvastatin 5 mg, aspirin 81 mg.        2.  Anxiety/Depression  I feel the patient's atypical chest pain may be due to anxiety.  However an ischemic evaluation is indicated to exclude cardiac cause.   Continue Prozac as indicated per Dr. Bee.    Added buspirone 10 mg 3 times daily.  Patient given instructions regarding dosing, instructions on when to take.  Patient states understanding of instructions.  Instructed to take first at night to see how her body responds.         Follow up: 1 month              This document has been electronically signed by BRITTANY Sandoval on January 30, 2020 4:05 PM

## 2020-02-01 ENCOUNTER — DOCUMENTATION (OUTPATIENT)
Dept: CARDIOLOGY | Facility: CLINIC | Age: 65
End: 2020-02-01

## 2020-02-01 NOTE — PROGRESS NOTES
Notified yesterday on 1/31/2020 of patient canceling her nuclear stress test, echo and scheduled follow up with me d/t not able to come to these things due to work and that she is undergoing PT for her knee.                 This document has been electronically signed by BRITTANY Sandoval on February 1, 2020 10:56 AM

## 2020-02-03 ENCOUNTER — HOSPITAL ENCOUNTER (OUTPATIENT)
Dept: PHYSICAL THERAPY | Facility: HOSPITAL | Age: 65
Setting detail: THERAPIES SERIES
Discharge: HOME | End: 2020-02-03

## 2020-02-03 DIAGNOSIS — S86.912A KNEE STRAIN, LEFT, INITIAL ENCOUNTER: Primary | ICD-10-CM

## 2020-02-03 PROCEDURE — G0283 ELEC STIM OTHER THAN WOUND: HCPCS

## 2020-02-03 PROCEDURE — 97110 THERAPEUTIC EXERCISES: CPT

## 2020-02-03 NOTE — THERAPY TREATMENT NOTE
Outpatient Physical Therapy Ortho Treatment Note  [facility]     Patient Name: Neda Ignacio  : 1955  MRN: [id number]  Today's Date: 2/3/2020      Visit Date: 2020     Subjective Improvement: 30%  Attendance:  8/8 (3x/wk for 4 weeks, 12 visits)  Next MD Visit : 2020  Recert Date:  2020        Therapy Diagnosis:  Left Knee Strain     Addendum: Changed POC to Hold PT per MD on 2020, ordering an MRI      Visit Dx:    ICD-10-CM ICD-9-CM   1. Knee strain, left, initial encounter S86.912A 844.9       Patient Active Problem List   Diagnosis   • Chronic pain of left knee   • History of fracture of patella   • Chest pain, atypical   • Anxiety   • Depression        Past Medical History:   Diagnosis Date   • Acid reflux    • Anxiety    • Arthritis    • Depression    • Disease of thyroid gland    • Tuberculosis         Past Surgical History:   Procedure Laterality Date   • HARDWARE REMOVAL Left 2017    Procedure: HARDWARE REMOVAL LEFT KNEE                                                  (4-0 cannulated screws two and two free pieces of cerclage wire);  Surgeon: Paul Biggs MD;  Location: [facility];  Service:    • HYSTERECTOMY     • KNEE SURGERY Left     hardware placed secondary to trauma       PT Ortho     Row Name 20 0700       Subjective Comments    Subjective Comments  pt reports that her knee is still the same; Mostly doing walking without the crutches and brace on L LEl; She states that she still feels that the knee is going to give way on her and having pain in the posterior knee; Pt reports that she has been icing more at home but still feels like the knee swells when she is up and on it.   -       Precautions and Contraindications    Precautions/Limitations  no known precautions/limitations  -KH       Subjective Pain    Post-Treatment Pain Level  2  -       Posture/Observations    Posture/Observations Comments  DUHB L no AD; antalgic gait with  decreased knee flexion on L   -KH      User Key  (r) = Recorded By, (t) = Taken By, (c) = Cosigned By    Initials Name Provider Type    Marcia Baker PTA Physical Therapy Assistant                      PT Assessment/Plan     Row Name 02/03/20 0700          PT Assessment    Assessment Comments  MD note faxed this date; Continues to have increased pain with WB activites mostly under the patella and unable to complete those that cause her pain; very aware of pain and vocalizes all pain throughout treatment; pt is concerned about the MD sending her back to work full duty while still having pain in the knee; Educated pt to continue with HEP and icing at home and progressing gait as able within pain free range; strength and ROM is within normal limits  -        PT Plan    PT Frequency  2x/week;3x/week  -     Predicted Duration of Therapy Intervention (Therapy Eval)  6 weeks 3x/wk for 4 weeks  -     PT Plan Comments  4 more approved visits after today; Pt to see MD tomorrow; will continue as advised. Montior pain in the L knee with exercises.   -       User Key  (r) = Recorded By, (t) = Taken By, (c) = Cosigned By    Initials Name Provider Type    Marcia Baker PTA Physical Therapy Assistant          Modalities     Row Name 02/03/20 0700             Ice    Ice Applied  Yes  -KH      Location  L knee w/ elevation & IFC  -KH      Rx Minutes  15 mins  -      Ice S/P Rx  Yes  -KH         ELECTRICAL STIMULATION    Attended/Unattended  Unattended  -      Stimulation Type  IFC Low  -KH      Location/Electrode Placement/Other  L knee with elevation & ICE  -KH        User Key  (r) = Recorded By, (t) = Taken By, (c) = Cosigned By    Initials Name Provider Type    Marcia Baker PTA Physical Therapy Assistant        OP Exercises     Row Name 02/03/20 0700             Subjective Comments    Subjective Comments  pt reports that her knee is still the same; Mostly doing walking without the crutches and brace on L LEl;  "She states that she still feels that the knee is going to give way on her and having pain in the posterior knee; Pt reports that she has been icing more at home but still feels like the knee swells when she is up and on it.   -KH         Subjective Pain    Able to rate subjective pain?  yes  -KH      Pre-Treatment Pain Level  3 When she doesn't have the brace on  -KH      Post-Treatment Pain Level  2  -KH         Total Minutes    49537 - PT Therapeutic Exercise Minutes  50  -KH         Exercise 1    Exercise Name 1  pro ll LE ROM Brace on L   -KH      Cueing 1  Verbal  -KH      Time 1  10  -KH      Additional Comments  level 3.5; seat 10   -KH         Exercise 2    Exercise Name 2  incline stretch  -KH      Cueing 2  Verbal  -KH      Sets 2  3  -KH      Time 2  30\"  -KH         Exercise 3    Exercise Name 3  Lunge Stretch at step for knee flexion  -KH      Cueing 3  Verbal  -KH      Sets 3  1  -KH      Reps 3  10  -KH      Additional Comments  pain under patella   -KH         Exercise 4    Exercise Name 4  step ups-fwd  -KH      Cueing 4  Verbal  -KH      Sets 4  2  -KH      Reps 4  15  -KH      Additional Comments  4\" (pain under patella)  -KH         Exercise 5    Exercise Name 5  step ups lateral   -KH      Cueing 5  Verbal  -KH      Sets 5  1  -KH      Reps 5  10  -KH      Additional Comments  only able to complete 3, increased pain and unable to complete  -KH         Exercise 6    Exercise Name 6  add squeezes  -KH      Cueing 6  Verbal  -KH      Sets 6  2  -KH      Reps 6  10  -KH         Exercise 7    Exercise Name 7  LAQ   -KH      Cueing 7  Verbal  -KH      Sets 7  2  -KH      Reps 7  10  -KH         Exercise 8    Exercise Name 8  seated abd w/ tband   -KH      Cueing 8  Verbal  -KH      Sets 8  2  -KH      Reps 8  10  -KH      Additional Comments  Blue tband  -KH         Exercise 9    Exercise Name 9  Bridges  -KH      Cueing 9  Verbal  -KH      Sets 9  2  -KH      Reps 9  10  -KH         Exercise 10    " Exercise Name 10  Ham Sets with feet over ball  -      Cueing 10  Verbal  -      Sets 10  2  -KH      Reps 10  10  -        User Key  (r) = Recorded By, (t) = Taken By, (c) = Cosigned By    Initials Name Provider Type    Marcia Baker PTA Physical Therapy Assistant                       PT OP Goals     Row Name 02/03/20 0700          PT Short Term Goals    STG Date to Achieve  02/05/20  -     STG 1  Patient will be independent with HEP.   -     STG 1 Progress  Ongoing  -     STG 2  Pateint will exhibit left knee ROM equal to right.   -     STG 2 Progress  Progressing  -        Long Term Goals    LTG Date to Achieve  02/26/20  -     LTG 1  Patient will score 40 on LEFS.   -     LTG 1 Progress  Progressing  -     LTG 2  Patient will be able to perform 20 consecutive sit to stand without UE support and without an increase in symptoms.   -     LTG 2 Progress  Progressing  -     LTG 3  Patient will be able to ambulate 2 blocks without AD and with good gait mechanics.   -     LTG 3 Progress  Progressing  -        Time Calculation    PT Goal Re-Cert Due Date  02/05/20  -       User Key  (r) = Recorded By, (t) = Taken By, (c) = Cosigned By    Initials Name Provider Type    Marcia Baker PTA Physical Therapy Assistant                         Time Calculation:   Start Time: 0720  Stop Time: 0825  Time Calculation (min): 65 min  Total Timed Code Minutes- PT: 50 minute(s)  Therapy Charges for Today     Code Description Service Date Service Provider Modifiers Qty    11285907480 HC PT THER PROC EA 15 MIN 2/3/2020 Marcia Rios PTA GP 3    34840206549 HC PT ELECTRICAL STIM UNATTENDED 2/3/2020 Marcia Rios PTA  1    65245170362 HC PT THER SUPP EA 15 MIN 2/3/2020 Marcia Rios PTA GP 1                    Marcia Rios PTA  2/3/2020

## 2020-02-05 ENCOUNTER — APPOINTMENT (OUTPATIENT)
Dept: PHYSICAL THERAPY | Facility: HOSPITAL | Age: 65
End: 2020-02-05

## 2020-02-06 ENCOUNTER — HOSPITAL ENCOUNTER (OUTPATIENT)
Dept: PHYSICAL THERAPY | Facility: HOSPITAL | Age: 65
Setting detail: THERAPIES SERIES
Discharge: HOME | End: 2020-02-06

## 2020-02-06 ENCOUNTER — APPOINTMENT (OUTPATIENT)
Dept: PHYSICAL THERAPY | Facility: HOSPITAL | Age: 65
End: 2020-02-06

## 2020-02-06 ENCOUNTER — TELEPHONE (OUTPATIENT)
Dept: PHYSICAL THERAPY | Facility: HOSPITAL | Age: 65
End: 2020-02-06

## 2020-02-06 DIAGNOSIS — S86.912A KNEE STRAIN, LEFT, INITIAL ENCOUNTER: Primary | ICD-10-CM

## 2020-02-06 PROCEDURE — 97110 THERAPEUTIC EXERCISES: CPT

## 2020-02-06 PROCEDURE — G0283 ELEC STIM OTHER THAN WOUND: HCPCS

## 2020-02-06 NOTE — THERAPY TREATMENT NOTE
Outpatient Physical Therapy Ortho Treatment Note  [facility]     Patient Name: Neda Ignacio  : 1955  MRN: [id number]  Today's Date: 2020      Visit Date: 2020     Subjective Improvement: 30%  Attendance:   (3x/wk for 4 weeks, 12 visits)  Next MD Visit : 2020  Recert Date:  2020        Therapy Diagnosis:  Left Knee Strain     Visit Dx:    ICD-10-CM ICD-9-CM   1. Knee strain, left, initial encounter S86.912A 844.9       Patient Active Problem List   Diagnosis   • Chronic pain of left knee   • History of fracture of patella   • Chest pain, atypical   • Anxiety   • Depression        Past Medical History:   Diagnosis Date   • Acid reflux    • Anxiety    • Arthritis    • Depression    • Disease of thyroid gland    • Tuberculosis         Past Surgical History:   Procedure Laterality Date   • HARDWARE REMOVAL Left 2017    Procedure: HARDWARE REMOVAL LEFT KNEE                                                  (4-0 cannulated screws two and two free pieces of cerclage wire);  Surgeon: Paul Biggs MD;  Location: [facility];  Service:    • HYSTERECTOMY     • KNEE SURGERY Left     hardware placed secondary to trauma       PT Ortho     Row Name 20 0700       Precautions and Contraindications    Precautions/Limitations  no known precautions/limitations  -       Posture/Observations    Posture/Observations Comments  DUHB (meuller) on L with SPC; Mildly antalgic  -      User Key  (r) = Recorded By, (t) = Taken By, (c) = Cosigned By    Initials Name Provider Type    Marcia Baker PTA Physical Therapy Assistant                      PT Assessment/Plan     Row Name 20 0700          PT Assessment    Assessment Comments  patient continues with pain in the left knee throughout treatment; swelling improved this date; pt expressed concerns thorughout treatment about the MD and at one point was in tears secondary to her frustration. Had to a lot of reassurance  throughout treatment.   -        PT Plan    PT Frequency  2x/week;3x/week  -KH     Predicted Duration of Therapy Intervention (Therapy Eval)  6 weeks  3x/wk for 4 weeks per comp  -     PT Plan Comments  3 more approved visits; Return to MD 02/11/2020 ; Continue to increase strength, ROM, gait as able.   -       User Key  (r) = Recorded By, (t) = Taken By, (c) = Cosigned By    Initials Name Provider Type    Marcia Baker PTA Physical Therapy Assistant          Modalities     Row Name 02/06/20 0700             Ice    Ice Applied  Yes  -KH      Location  L knee w/ elevation & IFC  -KH      Rx Minutes  15 mins  -KH      Ice S/P Rx  Yes  -         ELECTRICAL STIMULATION    Attended/Unattended  Unattended  -      Stimulation Type  IFC Low  -      Location/Electrode Placement/Other  L knee with elevation & ICE  -KH        User Key  (r) = Recorded By, (t) = Taken By, (c) = Cosigned By    Initials Name Provider Type    Marcia Baker PTA Physical Therapy Assistant        OP Exercises     Row Name 02/06/20 0700             Subjective Comments    Subjective Comments  Patient continues to have pain about the same as last visit. Pain decreases after putting the brace on and getting up and walking around. Pain still present when walking without brace at home first thing in the morning. It also continues to swell on her the more she is up and on it. Switched to her old knee brace thinking that the other brace may be causing her pain; Is icing more at home which seems to be helping with pain. Patient reports that she is really fustrated with her MD at this time and has requested to see another MD at this time. Not sure if comp will approve; Pt expressed how unhappy she was today with the MD and how he treated her on 02/04/2020;  She feels as if the MD is trying to make it an old injury;   -         Subjective Pain    Able to rate subjective pain?  yes  -MAYELA      Pre-Treatment Pain Level  3  -KH      Post-Treatment  "Pain Level  3  -KH         Total Minutes    68264 - PT Therapeutic Exercise Minutes  55  -KH         Exercise 1    Exercise Name 1  pro ll LE ROM Brace on L   -KH      Cueing 1  Verbal  -KH      Time 1  10  -KH      Additional Comments  level 2.5; seat 10   -KH         Exercise 2    Exercise Name 2  incline stretch  -KH      Cueing 2  Verbal  -KH      Sets 2  3  -KH      Time 2  30\"  -KH         Exercise 3    Exercise Name 3  standing hamstring stretch  -KH      Cueing 3  Verbal  -KH      Sets 3  3  -KH      Time 3  30\"  -KH         Exercise 4    Exercise Name 4  CR/TR  -KH      Cueing 4  Verbal  -KH      Sets 4  2  -KH      Reps 4  10  -KH         Exercise 5    Exercise Name 5  step ups fwd  -KH      Cueing 5  Verbal  -KH      Sets 5  2  -KH      Reps 5  10  -KH      Additional Comments  4\" step; pain after 7 of each set  -KH         Exercise 6    Exercise Name 6  quad sets  -KH      Cueing 6  Verbal  -KH      Sets 6  3  -KH      Reps 6  10  -KH         Exercise 7    Exercise Name 7  SAQ over small bolster  -KH      Cueing 7  Verbal  -KH      Sets 7  3  -KH      Reps 7  10  -KH      Additional Comments  2# aw  -KH        User Key  (r) = Recorded By, (t) = Taken By, (c) = Cosigned By    Initials Name Provider Type    Marcia Baker, PTA Physical Therapy Assistant                       PT OP Goals     Row Name 02/06/20 0700          PT Short Term Goals    STG Date to Achieve  02/05/20  -KH     STG 1  Patient will be independent with HEP.   -KH     STG 1 Progress  Ongoing  -KH     STG 2  Pateint will exhibit left knee ROM equal to right.   -     STG 2 Progress  Progressing  -        Long Term Goals    LTG Date to Achieve  02/26/20  -KH     LTG 1  Patient will score 40 on LEFS.   -KH     LTG 1 Progress  Progressing  -KH     LTG 2  Patient will be able to perform 20 consecutive sit to stand without UE support and without an increase in symptoms.   -KH     LTG 2 Progress  Progressing  -KH     LTG 3  Patient will be " able to ambulate 2 blocks without AD and with good gait mechanics.   -MAYELA     LTG 3 Progress  Progressing  -MAYELA        Time Calculation    PT Goal Re-Cert Due Date  02/05/20  -MAYELA       User Key  (r) = Recorded By, (t) = Taken By, (c) = Cosigned By    Initials Name Provider Type    Marcia Baker PTA Physical Therapy Assistant                         Time Calculation:   Start Time: 0726  Stop Time: 0836  Time Calculation (min): 70 min  Total Timed Code Minutes- PT: 55 minute(s)  Therapy Charges for Today     Code Description Service Date Service Provider Modifiers Qty    78911123729 HC PT THER PROC EA 15 MIN 2/6/2020 Marcia Rios PTA GP 4    15978421498 HC PT ELECTRICAL STIM UNATTENDED 2/6/2020 Marcia Rios PTA  1    84294891901 HC PT THER SUPP EA 15 MIN 2/6/2020 Marcia Rios PTA GP 1                    Marcia Rios PTA  2/6/2020

## 2020-02-06 NOTE — TELEPHONE ENCOUNTER
"Patient was see on 02/03/2020 for PT.  A MD note was faxed to Dr. Smalls on 02/03/2020 at 0813 and he was scheduled to see the patient on 02/04/2020.  The MD note that I faxed on 02/03/2020 was faxed back to our clinic at [facility] on 02/03/2020 at 0845 with the MD signature and stated \"Hold PT ---> MRI\".  At that point all appts were cancelled and the patient was left two messages to make her aware of this.  I then went and added an addendum to my note on 02/03/2020 to hold PT.      Patient showed up on 02/05/2020 for her old scheduled appt thinking that she was suppose to be at PT secondary to the MD telling patient on 02/04/2020 that he wanted her to continue PT.      Patient was explained to that she could not be seen that day secondary to MD note that we had on file stated that we were to HOLD PT.      HR was notified by patient and office staff called [facility] office to get clarification on current POC sent on 02/03/2020;  After this the same note that was faxed back on 02/03/2020 was faxed back again with the line through \"Hold PT\" and replaced with Continue PT (update) 02/04/2020.      All MD notes are scanned in the media tab in patients Epic chart.     At this time patient was rescheduled to be seen today in the clinic at 0730 in which the patient did attend and complete session.     We will continue PT and primary PT will be notified so that POC can be resent to MD to continue PT at this time.   "

## 2020-02-10 ENCOUNTER — APPOINTMENT (OUTPATIENT)
Dept: PHYSICAL THERAPY | Facility: HOSPITAL | Age: 65
End: 2020-02-10

## 2020-02-10 ENCOUNTER — HOSPITAL ENCOUNTER (OUTPATIENT)
Dept: PHYSICAL THERAPY | Facility: HOSPITAL | Age: 65
Setting detail: THERAPIES SERIES
Discharge: HOME | End: 2020-02-10

## 2020-02-10 DIAGNOSIS — S86.912A KNEE STRAIN, LEFT, INITIAL ENCOUNTER: Primary | ICD-10-CM

## 2020-02-10 PROCEDURE — G0283 ELEC STIM OTHER THAN WOUND: HCPCS

## 2020-02-10 PROCEDURE — 97110 THERAPEUTIC EXERCISES: CPT

## 2020-02-10 NOTE — THERAPY TREATMENT NOTE
Outpatient Physical Therapy Ortho Treatment Note  [facility]     Patient Name: Neda Ignacio  : 1955  MRN: [id number]  Today's Date: 2/10/2020      Visit Date: 02/10/2020     Subjective Improvement: 50%  Attendance:  10/10 (3x/wk for 4 weeks, 12 visits)  Next MD Visit : 2020  Recert Date:  2020        Therapy Diagnosis:  Left Knee Strain     Visit Dx:    ICD-10-CM ICD-9-CM   1. Knee strain, left, initial encounter S86.912A 844.9       Patient Active Problem List   Diagnosis   • Chronic pain of left knee   • History of fracture of patella   • Chest pain, atypical   • Anxiety   • Depression        Past Medical History:   Diagnosis Date   • Acid reflux    • Anxiety    • Arthritis    • Depression    • Disease of thyroid gland    • Tuberculosis         Past Surgical History:   Procedure Laterality Date   • HARDWARE REMOVAL Left 2017    Procedure: HARDWARE REMOVAL LEFT KNEE                                                  (4-0 cannulated screws two and two free pieces of cerclage wire);  Surgeon: Paul Biggs MD;  Location: [facility] OR;  Service:    • HYSTERECTOMY     • KNEE SURGERY Left     hardware placed secondary to trauma       PT Ortho     Row Name 02/10/20 07       Precautions and Contraindications    Precautions/Limitations  no known precautions/limitations  -MAYELA    Precautions  sit-down duty at work per MD restrictions  -MAYELA       Posture/Observations    Posture/Observations Comments  DUH L knee no AD; mildly antalgic  -       Left Lower Ext    Lt Knee Extension/Flexion AROM  0-133 with minial pain   -       MMT (Manual Muscle Testing)    General MMT Comments   quad & ham   -KH      User Key  (r) = Recorded By, (t) = Taken By, (c) = Cosigned By    Initials Name Provider Type    Marcia Baker PTA Physical Therapy Assistant                      PT Assessment/Plan     Row Name 02/10/20 0700          PT Assessment    Assessment Comments  MD note faxed to MD for appt  tomorrow. Pt gives good effort with all exercises but still has pain with weight bearing activties mostly at medial joint line and under patella per her report; Knee ROM and strength are within nomral functional movement. fearful still of returning to work full duty expressed during treatment. Educated patient to listen to her pain as far as progressing at work and to stay within the MD shawelines at this time;   -        PT Plan    PT Frequency  2x/week;3x/week  -     Predicted Duration of Therapy Intervention (Therapy Eval)  6 weeks 3x/week for 4 weeks per comp  -     PT Plan Comments  2 more approved visits after this date; Returns to MD 02/11/2020;   -       User Key  (r) = Recorded By, (t) = Taken By, (c) = Cosigned By    Initials Name Provider Type    Marcia Baker PTA Physical Therapy Assistant          Modalities     Row Name 02/10/20 0700             Ice    Ice Applied  Yes  -      Location  L knee w/ elevation & IFC  -KH      Rx Minutes  15 mins  -KH      Ice S/P Rx  Yes  -         ELECTRICAL STIMULATION    Attended/Unattended  Unattended  -      Stimulation Type  IFC Low  -KH      Location/Electrode Placement/Other  L knee with elevation & ICE  -KH       PT E-Stim Unattended (Manual) Minutes  15  -KH        User Key  (r) = Recorded By, (t) = Taken By, (c) = Cosigned By    Initials Name Provider Type    Marcia Baker PTA Physical Therapy Assistant        OP Exercises     Row Name 02/10/20 0700             Subjective Comments    Subjective Comments  Still feels tight and compressed with and without the brace but the brace continues to help with the pain; Still reporting that the knee pain increases when she trys to take a few steps in the morning without her brace; not using cane or crutches as long as she has the brace on; Pt reports that she did not have any problems with her knee before she did this at work; Reports that she now has to take the elevator at work when she use to use  "the stairs; Reports to PT with her restrictions from MD, stating that work wants PT to clarify these because they feel that they are to vague;  Patient does report that the pain is somewhat improving; Pt does believe that she is 50% better but unsure if she is ready to go back to full duty intil she knows what is going on inside her knee.   -KH         Subjective Pain    Able to rate subjective pain?  yes  -KH      Pre-Treatment Pain Level  0 3/10 when up and on it w/o the brace  -KH      Post-Treatment Pain Level  1  -KH         Exercise 1    Exercise Name 1  pro ll LE ROM Brace on L   -KH      Cueing 1  Verbal  -KH      Time 1  10  -KH      Additional Comments  level 3; seat 10;   -KH         Exercise 2    Exercise Name 2  incline stretch  -KH      Cueing 2  Verbal  -KH      Sets 2  3  -KH      Time 2  30\"  -KH         Exercise 3    Exercise Name 3  standing hamstring stretch  -KH      Cueing 3  Verbal  -KH      Sets 3  3  -KH      Time 3  30\"  -KH         Exercise 4    Exercise Name 4  standing lunge stretch  -KH      Cueing 4  Verbal  -KH      Sets 4  1  -KH      Reps 4  10  -KH      Time 4  10\" holds for knee flexion  -KH      Additional Comments  no pain per patient with this exercise  -KH         Exercise 5    Exercise Name 5  Mini Squats  -KH      Cueing 5  Verbal  -KH      Sets 5  2  -KH      Reps 5  10  -KH      Additional Comments  \"hurts just a little bit\" after the first set, 3 into second set \"its starting to hurt\" pain coming from under knee cap   -KH         Exercise 6    Exercise Name 6  step ups fwd  -KH      Cueing 6  Verbal  -KH      Sets 6  1  -KH      Reps 6  10  -KH      Time 6  6\" step   -KH      Additional Comments  \"That hurts after 8 in the first set\"  -KH         Exercise 7    Exercise Name 7  Ramp fwd Up/down  -KH      Cueing 7  Verbal  -KH      Sets 7  1  -KH      Reps 7  5 trips  -KH      Additional Comments  \"Just have trouble with balance, hurts a little but not where I can't do it\" " Attempted first trip without HR even when instructed to do so, had to reinforce the reasoning behind using the HR for safety and balance;   -         Exercise 8    Exercise Name 8  Ramp up bkd/down fwd with HRs  -      Cueing 8  Verbal  -      Sets 8  1  -KH      Reps 8  5 trips  -         Exercise 9    Exercise Name 9  SAQ over lg bolster  -      Cueing 9  Verbal  -KH      Sets 9  3  -KH      Reps 9  10  -KH      Additional Comments  2# aw  -        User Key  (r) = Recorded By, (t) = Taken By, (c) = Cosigned By    Initials Name Provider Type    Marcia Baker PTA Physical Therapy Assistant                       PT OP Goals     Row Name 02/10/20 0700          PT Short Term Goals    STG Date to Achieve  02/05/20  -     STG 1  Patient will be independent with HEP.   -     STG 1 Progress  Ongoing  -     STG 2  Pateint will exhibit left knee ROM equal to right.   -     STG 2 Progress  Progressing  -        Long Term Goals    LTG Date to Achieve  02/26/20  -     LTG 1  Patient will score 40 on LEFS.   -     LTG 1 Progress  Progressing  -     LTG 2  Patient will be able to perform 20 consecutive sit to stand without UE support and without an increase in symptoms.   -     LTG 2 Progress  Progressing  -     LTG 3  Patient will be able to ambulate 2 blocks without AD and with good gait mechanics.   -     LTG 3 Progress  Progressing  -        Time Calculation    PT Goal Re-Cert Due Date  02/05/20  -       User Key  (r) = Recorded By, (t) = Taken By, (c) = Cosigned By    Initials Name Provider Type    Marcia Baker PTA Physical Therapy Assistant                         Time Calculation:   Start Time: 0727  Stop Time: 0830  Time Calculation (min): 63 min  Total Timed Code Minutes- PT: 48 minute(s)  Therapy Charges for Today     Code Description Service Date Service Provider Modifiers Qty    89233011936 HC PT THER SUPP EA 15 MIN 2/10/2020 Marcia Rios PTA GP 1    [id number]  PT  ELECTRICAL STIM UNATTENDED 2/10/2020 Marcia Rios, PTA  1    44715927196 HC PT THER PROC EA 15 MIN 2/10/2020 Marcia Rios, CHERYL GP 3                    Marcia Rios, CHERYL  2/10/2020

## 2020-02-12 ENCOUNTER — APPOINTMENT (OUTPATIENT)
Dept: PHYSICAL THERAPY | Facility: HOSPITAL | Age: 65
End: 2020-02-12

## 2020-02-13 ENCOUNTER — APPOINTMENT (OUTPATIENT)
Dept: PHYSICAL THERAPY | Facility: HOSPITAL | Age: 65
End: 2020-02-13

## 2020-02-17 ENCOUNTER — APPOINTMENT (OUTPATIENT)
Dept: PHYSICAL THERAPY | Facility: HOSPITAL | Age: 65
End: 2020-02-17

## 2020-02-17 ENCOUNTER — APPOINTMENT (OUTPATIENT)
Dept: CARDIOLOGY | Facility: HOSPITAL | Age: 65
End: 2020-02-17

## 2020-02-17 ENCOUNTER — APPOINTMENT (OUTPATIENT)
Dept: NUCLEAR MEDICINE | Facility: HOSPITAL | Age: 65
End: 2020-02-17

## 2020-02-19 ENCOUNTER — APPOINTMENT (OUTPATIENT)
Dept: MRI IMAGING | Facility: HOSPITAL | Age: 65
End: 2020-02-19

## 2020-02-19 ENCOUNTER — HOSPITAL ENCOUNTER (OUTPATIENT)
Dept: MRI IMAGING | Facility: HOSPITAL | Age: 65
Discharge: HOME | End: 2020-02-19
Admitting: GENERAL PRACTICE

## 2020-02-19 ENCOUNTER — APPOINTMENT (OUTPATIENT)
Dept: PHYSICAL THERAPY | Facility: HOSPITAL | Age: 65
End: 2020-02-19

## 2020-02-19 DIAGNOSIS — M25.562 LEFT KNEE PAIN, UNSPECIFIED CHRONICITY: ICD-10-CM

## 2020-02-19 PROCEDURE — 73721 MRI JNT OF LWR EXTRE W/O DYE: CPT

## 2020-02-26 ENCOUNTER — TRANSCRIBE ORDERS (OUTPATIENT)
Dept: ORTHOPEDIC SURGERY | Facility: CLINIC | Age: 65
End: 2020-02-26

## 2020-02-26 DIAGNOSIS — S86.912A KNEE STRAIN, LEFT, INITIAL ENCOUNTER: Primary | ICD-10-CM

## 2020-02-27 ENCOUNTER — OFFICE VISIT (OUTPATIENT)
Dept: ORTHOPEDIC SURGERY | Facility: CLINIC | Age: 65
End: 2020-02-27

## 2020-02-27 VITALS
SYSTOLIC BLOOD PRESSURE: 146 MMHG | WEIGHT: 187.3 LBS | BODY MASS INDEX: 30.1 KG/M2 | DIASTOLIC BLOOD PRESSURE: 82 MMHG | OXYGEN SATURATION: 98 % | TEMPERATURE: 98.5 F | HEIGHT: 66 IN | HEART RATE: 64 BPM

## 2020-02-27 DIAGNOSIS — M17.11 OSTEOARTHRITIS OF RIGHT KNEE, UNSPECIFIED OSTEOARTHRITIS TYPE: ICD-10-CM

## 2020-02-27 DIAGNOSIS — M25.562 LEFT KNEE PAIN, UNSPECIFIED CHRONICITY: Primary | ICD-10-CM
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
## 2020-02-27 PROCEDURE — 99203 OFFICE O/P NEW LOW 30 MIN: CPT | Performed by: ORTHOPAEDIC SURGERY

## 2020-02-27 PROCEDURE — 20610 DRAIN/INJ JOINT/BURSA W/O US: CPT | Performed by: ORTHOPAEDIC SURGERY

## 2020-02-27 RX ORDER — TRIAMCINOLONE ACETONIDE 40 MG/ML
80 INJECTION, SUSPENSION INTRA-ARTICULAR; INTRAMUSCULAR
Status: COMPLETED | OUTPATIENT
Start: 2020-02-27 | End: 2020-02-27

## 2020-02-27 RX ORDER — BUPIVACAINE HYDROCHLORIDE 5 MG/ML
3 INJECTION, SOLUTION PERINEURAL
Status: COMPLETED | OUTPATIENT
Start: 2020-02-27 | End: 2020-02-27

## 2020-02-27 RX ORDER — LIDOCAINE HYDROCHLORIDE AND EPINEPHRINE 10; 10 MG/ML; UG/ML
2 INJECTION, SOLUTION INFILTRATION; PERINEURAL
Status: COMPLETED | OUTPATIENT
Start: 2020-02-27 | End: 2020-02-27

## 2020-02-27 RX ADMIN — BUPIVACAINE HYDROCHLORIDE 3 ML: 5 INJECTION, SOLUTION PERINEURAL at 08:56

## 2020-02-27 RX ADMIN — LIDOCAINE HYDROCHLORIDE AND EPINEPHRINE 2 ML: 10; 10 INJECTION, SOLUTION INFILTRATION; PERINEURAL at 08:56

## 2020-02-27 RX ADMIN — TRIAMCINOLONE ACETONIDE 80 MG: 40 INJECTION, SUSPENSION INTRA-ARTICULAR; INTRAMUSCULAR at 08:56

## 2020-02-27 NOTE — PROGRESS NOTES
Neda Ignacio is a 65 y.o. female   Primary provider:  Prateek Bee MD       Chief Complaint   Patient presents with   • Left Knee - Pain   • Establish Care       HISTORY OF PRESENT ILLNESS: Patient being seen for left knee pain due to work related injury occurring 01/09/2020. Patient seen at  ED and MRI done. Reports no pain at rest, however increased pain with ambulation and walking up stairs.     This is the first office visit for evaluation of left knee pain.    Ms. Naidu is 65 years old.  She said that she sustained a fracture of her left patella in May 2016.  This was treated surgically in [place].  He subsequently had hardware removal here.  She denies any problems with the knee prior to her fracture.  He said her pain symptoms improved after hardware removal.  He was on the job on 9 January of this year and sustained a minor twisting injury to the left knee.  She had a popping sensation and has had pain since then.  The pain is fairly diffuse in nature worse with standing and walking.  Treatment has included an anti-inflammatory medication and formal therapy.  It is unclear exactly what exercise she is been doing at home but apparently has consisted of range of motion exercises.  She has continued working with restrictions.  He has used a cane intermittently.    Home medications include BuSpar Prozac Flonase Synthroid and Singulair among others.  She is allergic to penicillin.  She does not smoke.  Past medical history is remarkable for reflux hypothyroidism anxiety and depression.  She is employed as a cook.  She is .  She said that the fracture of her patella occurred in the time that she was caring for her  with his terminal illness.  She also describes having an autistic child who is in a extended care facility.  Pain   This is a new problem. The current episode started more than 1 month ago (01/09/2020). Associated symptoms include numbness. Associated symptoms comments:  Crushing, grinding, clicking, swelling. The symptoms are aggravated by standing and walking. She has tried rest for the symptoms.        CONCURRENT MEDICAL HISTORY:    Past Medical History:   Diagnosis Date   • Acid reflux    • Anxiety    • Arthritis    • Depression    • Disease of thyroid gland    • Tuberculosis        Allergies   Allergen Reactions   • Penicillins GI Intolerance         Current Outpatient Medications:   •  acetaminophen (TYLENOL) 325 MG tablet, Take 650 mg by mouth Every 6 (Six) Hours As Needed for Mild Pain ., Disp: , Rfl:   •  aspirin 81 MG EC tablet, Take 81 mg by mouth Daily., Disp: , Rfl:   •  busPIRone (BUSPAR) 10 MG tablet, Take 1 tablet by mouth 3 (Three) Times a Day., Disp: 90 tablet, Rfl: 5  •  dicyclomine (BENTYL) 20 MG tablet, Take 1 tablet by mouth Every 6 (Six) Hours As Needed., Disp: 90 tablet, Rfl: 1  •  FLUoxetine (PROzac) 40 MG capsule, Take 1 capsule by mouth daily., Disp: 90 capsule, Rfl: 3  •  fluticasone (FLONASE) 50 MCG/ACT nasal spray, Instill 2 sprays into the nostril(s) as directed once daily., Disp: 16 g, Rfl: 11  •  ibuprofen (ADVIL,MOTRIN) 400 MG tablet, Take 1 tablet by mouth 2 (two) times daily with meals., Disp: 180 tablet, Rfl: 1  •  levothyroxine (SYNTHROID, LEVOTHROID) 112 MCG tablet, Take 1 tablet by mouth Every Morning Before Breakfast., Disp: 90 tablet, Rfl: 0  •  montelukast (SINGULAIR) 10 MG tablet, Take 1 tablet by mouth nightly., Disp: 30 tablet, Rfl: 11  •  raNITIdine (ZANTAC) 300 MG tablet, Take 1 tablet by mouth 2 (two) times daily., Disp: 60 tablet, Rfl: 5  •  rosuvastatin (CRESTOR) 5 MG tablet, Take 1 tablet by mouth every evening., Disp: 30 tablet, Rfl: 11    Past Surgical History:   Procedure Laterality Date   • HARDWARE REMOVAL Left 11/6/2017    Procedure: HARDWARE REMOVAL LEFT KNEE                                                  (4-0 cannulated screws two and two free pieces of cerclage wire);  Surgeon: Paul Biggs MD;  Location:   "[facility];  Service:    • HYSTERECTOMY     • KNEE SURGERY Left     hardware placed secondary to trauma       Family History   Problem Relation Age of Onset   • Heart disease Mother    • Arthritis Mother    • Heart disease Father    • Hypertension Father    • Arthritis Father    • Heart disease Sister    • Hypertension Sister    • Arthritis Sister    • Heart disease Brother    • Hypertension Brother    • Arthritis Brother    • No Known Problems Maternal Grandmother    • No Known Problems Maternal Grandfather    • No Known Problems Paternal Grandmother    • No Known Problems Paternal Grandfather    • Autism Son    • No Known Problems Daughter    • Diabetes Other        Social History     Socioeconomic History   • Marital status:      Spouse name: Not on file   • Number of children: Not on file   • Years of education: Not on file   • Highest education level: Not on file   Tobacco Use   • Smoking status: Former Smoker     Packs/day: 1.00     Years: 43.00     Pack years: 43.00     Last attempt to quit: 2010     Years since quitting: 10.1   • Smokeless tobacco: Never Used   Substance and Sexual Activity   • Alcohol use: Never     Frequency: Never   • Drug use: No   • Sexual activity: Defer        Review of Systems   HENT: Positive for hearing loss and tinnitus.    Eyes:        Dry eyes   Neurological: Positive for numbness.   Psychiatric/Behavioral: The patient is nervous/anxious.    All other systems reviewed and are negative.    Review of symptoms is as noted above.  PHYSICAL EXAMINATION:       Vitals:    02/27/20 0826   BP: 146/82   BP Location: Right arm   Patient Position: Sitting   Pulse: 64   Temp: 98.5 °F (36.9 °C)   SpO2: 98%   Weight: 85 kg (187 lb 4.8 oz)   Height: 167.6 cm (66\")   PainSc: 0-No pain       Physical Exam she is alert pleasant and in no apparent distress.  Somewhat slow to respond to questions occasionally but responds appropriately.      GAIT:     []  Normal  [x]  Antalgic    Assistive " device: [x]  None  []  Walker     []  Crutches  []  Cane     []  Wheelchair  []  Stretcher    Ortho Exam he walks with a slightly antalgic gait favoring the left.  There is a 16 cm longitudinal scar over the anterior aspect of the knee.  Knee motion is smooth from 0 to 125 degrees.  There is no patellofemoral crepitus.  There is no instability in varus and valgus stress.  There is a 1+ effusion of the knee but no warmth erythema.  Leg lengths are equal.  Hip motion is smooth and painless.  Posterior tibial pulses 2+.  Sensory exam is intact to soft touch.  Talar manipulation produces no crepitus and mild apparent pain.  There is mild tenderness over the anterior aspect of the lateral joint line.    Radiographs of the knee done recently show a healed fracture of the patella.  There is tricompartmental degenerative change most prominent in the medial and patellofemoral compartments.  MRI scan done in February of this year shows a moderate effusion.  There is moderate narrowing of the lateral patellofemoral joint space.  There is a popliteal cyst.  The cruciate ligaments are intact.  I see no meniscal tears.  There is thickening of the proximal portion of the patellar tendon but no evidence of tendon tear.    Large Joint Arthrocentesis: L knee  Date/Time: 2/27/2020 8:56 AM  Consent given by: patient  Site marked: site marked  Timeout: Immediately prior to procedure a time out was called to verify the correct patient, procedure, equipment, support staff and site/side marked as required   Supporting Documentation  Indications: pain   Procedure Details  Location: knee - L knee  Preparation: Patient was prepped and draped in the usual sterile fashion  Needle size: 22 G  Approach: anteromedial  Medications administered: 2 mL lidocaine-EPINEPHrine 1 %-1:409118; 3 mL bupivacaine 0.5 %; 80 mg triamcinolone acetonide 40 MG/ML  Patient tolerance: patient tolerated the procedure well with no immediate complications            Mri  Knee Left Without Contrast    Result Date: 2/19/2020  Narrative: [facility] [place] Patient Name: DEBI LYNN ORDERING: JOSE GALLOWAY ATTENDING: REFERRING: JOSE GALLOWAY ----------------------- PROCEDURE: MR left knee without contrast CLINICAL HISTORY: Left knee pain, injury, history of patellar surgery COMPARISON: Radiographs 1/9/2020 TECHNIQUE:  Multiplanar and multispin echo MRI sequences of the left knee were performed without the use of IV or intra-articular contrast. FINDINGS: Menisci:  The medial and lateral menisci are intact. Osseous structures and cartilage:  There is full-thickness patellar cartilage loss and near full-thickness femoral trochlear cartilage loss. There is a focal full-thickness cartilage defect along the weightbearing surface of the medial femoral condyle, approximately 0.9 cm transverse and 1.2 cm AP. The articular cartilage overlying the medial tibial plateau is mildly heterogeneous suggesting degeneration, without a focal defect. The articular cartilage in the lateral tibiofemoral compartment appears heterogeneous suggesting degeneration, without a focal defect. Tricompartmental osteophytes noted. Tubular signal abnormalities in the patella reflect prior surgery with healed fracture. No acute fracture. No osteochondral defect. Ligaments:  The cruciate and collateral ligaments are intact. Extensor mechanism:  The quadriceps and patellar tendons are intact. Additional findings:  There is a small joint effusion. There is a small Baker's cyst which measures 2.4 x 1.5 x 3.3 cm. Small amount of fluid inferior to the cyst suggests prior cyst leak or rupture.     Impression: 1. Full-thickness patellar cartilage loss. Focal full-thickness cartilage defect along the medial femoral condyle. 2. Small Baker's cyst, with findings suggesting prior cyst leak or rupture. 3. Healed patellar fracture. Electronically signed by:  Donna Bowen MD  2/19/2020 2:08 PM CST Workstation:  [id number]          ASSESSMENT: Left knee pain and effusion.  Think it is unlikely she sustained any significant structural pathology as a result of her work-related injury.  Natural history of her osteoarthritis was discussed with her.  Treatment options were reviewed.    Potential benefits of injection therapy were discussed.  She wished to proceed with this.    The left knee was prepped.  Skin was infiltrated with 1% Xylocaine with epinephrine.  The knee was then injected with a mixture of Marcaine Kenalog and Xylocaine with epinephrine.  There were no complications.    She May return to work with restrictions of no prolonged standing or walking and no work on heights.      ReTurn here in 1 month for clinical exam.  At present I see no surgical indications.    Diagnoses and all orders for this visit:    Left knee pain, unspecified chronicity  -     Large Joint Arthrocentesis: L knee    Work related injury  -     Large Joint Arthrocentesis: L knee    Osteoarthritis of right knee, unspecified osteoarthritis type          PLAN    Patient's Body mass index is 30.23 kg/m². BMI is above normal parameters. Recommendations include: exercise counseling and nutrition counseling.      Return in about 4 weeks (around 3/26/2020).    Paul Schneider MD

## 2020-03-24 ENCOUNTER — OFFICE VISIT (OUTPATIENT)
Dept: ORTHOPEDIC SURGERY | Facility: CLINIC | Age: 65
End: 2020-03-24

## 2020-03-24 VITALS — HEIGHT: 66 IN | WEIGHT: 187 LBS | HEART RATE: 74 BPM | BODY MASS INDEX: 30.05 KG/M2 | OXYGEN SATURATION: 98 %

## 2020-03-24 DIAGNOSIS — M25.562 LEFT KNEE PAIN, UNSPECIFIED CHRONICITY: ICD-10-CM
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
## 2020-03-24 PROCEDURE — 99214 OFFICE O/P EST MOD 30 MIN: CPT | Performed by: ORTHOPAEDIC SURGERY

## 2020-03-24 NOTE — PROGRESS NOTES
"Neda Ignacio is a 65 y.o. female returns for     Chief Complaint   Patient presents with   • Left Knee - Follow-up, Pain       HISTORY OF PRESENT ILLNESS:1 month follow up left knee work related injury 01/09/2020.  Pain scale today 4/10    Ms. Naidu said she had some improvement in her pain for a week or so after her injection.  Her symptoms have since begun to recur and consist of pain fairly diffusely in the knee.  She says there is no light duty work available for her.  She reports some urgency about return to work apparently for financial reasons.       CONCURRENT MEDICAL HISTORY:    The following portions of the patient's history were reviewed and updated as appropriate: allergies, current medications, past family history, past medical history, past social history, past surgical history and problem list.         PHYSICAL EXAMINATION:       Pulse 74   Ht 167.6 cm (66\")   Wt 84.8 kg (187 lb)   SpO2 98%   BMI 30.18 kg/m²     Physical Exam she is alert and in no apparent distress at rest.  She is somewhat anxious.    GAIT:     []  Normal  [x]  Antalgic    Assistive device: [x]  None  []  Walker     []  Crutches  []  Cane     []  Wheelchair  []  Stretcher    Ortho Exam she walks with a mild limp favoring the left.  There is 1+ effusion of the knee.  There is mild to moderate patellofemoral crepitus.      No results found.          ASSESSMENT: Left knee pain probably secondary to a flare of her osteoarthritis.    We had a prolonged discussion concerning treatment options.  She understands that knee arthroplasty may eventually be needed.  I do not think she would benefit from arthroscopic treatment.  She was instructed in symptomatic care including use of topical analgesics and soft support.  She was also instructed in judicious use of oral anti-inflammatories and analgesics.    She was given a note to allow her to return to unrestricted work on 27 March.    If her symptoms do not slowly improve she will call " for reevaluation.    Diagnoses and all orders for this visit:    Work related injury    Left knee pain, unspecified chronicity          PLAN    Return if symptoms worsen or fail to improve.    Paul Schneider MD

## 2020-07-07 DIAGNOSIS — R07.89 CHEST PAIN, ATYPICAL: ICD-10-CM

## 2020-07-07 RX ORDER — BUSPIRONE HYDROCHLORIDE 10 MG/1
10 TABLET ORAL 3 TIMES DAILY
Qty: 90 TABLET | Refills: 0 | Status: SHIPPED | OUTPATIENT
Start: 2020-07-07 | End: 2020-08-12 | Stop reason: SDUPTHER

## 2020-08-06 PROCEDURE — 87086 URINE CULTURE/COLONY COUNT: CPT | Performed by: EMERGENCY MEDICINE

## 2020-09-16 ENCOUNTER — TELEPHONE (OUTPATIENT)
Dept: ORTHOPEDIC SURGERY | Facility: CLINIC | Age: 65
End: 2020-09-16

## 2020-12-14 ENCOUNTER — DOCUMENTATION (OUTPATIENT)
Dept: PHYSICAL THERAPY | Facility: HOSPITAL | Age: 65
End: 2020-12-14

## 2020-12-14 DIAGNOSIS — S86.912A KNEE STRAIN, LEFT, INITIAL ENCOUNTER: Primary | ICD-10-CM

## 2020-12-21 ENCOUNTER — IMMUNIZATION (OUTPATIENT)
Dept: VACCINE CLINIC | Facility: HOSPITAL | Age: 65
End: 2020-12-21

## 2020-12-21 PROCEDURE — 91300 HC SARSCOV02 VAC 30MCG/0.3ML IM: CPT | Performed by: THORACIC SURGERY (CARDIOTHORACIC VASCULAR SURGERY)

## 2020-12-21 PROCEDURE — 0001A: CPT | Performed by: THORACIC SURGERY (CARDIOTHORACIC VASCULAR SURGERY)

## 2021-01-09 ENCOUNTER — HOSPITAL ENCOUNTER (EMERGENCY)
Facility: HOSPITAL | Age: 66
Discharge: HOME | End: 2021-01-09
Attending: FAMILY MEDICINE | Admitting: FAMILY MEDICINE

## 2021-01-09 VITALS
HEART RATE: 97 BPM | DIASTOLIC BLOOD PRESSURE: 91 MMHG | RESPIRATION RATE: 16 BRPM | WEIGHT: 175 LBS | BODY MASS INDEX: 28.12 KG/M2 | TEMPERATURE: 97.3 F | SYSTOLIC BLOOD PRESSURE: 143 MMHG | HEIGHT: 66 IN | OXYGEN SATURATION: 97 %

## 2021-01-09 DIAGNOSIS — S09.90XA INJURY OF HEAD, INITIAL ENCOUNTER: Primary | ICD-10-CM

## 2021-01-09 PROCEDURE — 90471 IMMUNIZATION ADMIN: CPT | Performed by: NURSE PRACTITIONER

## 2021-01-09 PROCEDURE — 90715 TDAP VACCINE 7 YRS/> IM: CPT | Performed by: NURSE PRACTITIONER

## 2021-01-09 PROCEDURE — 25010000002 TDAP 5-2.5-18.5 LF-MCG/0.5 SUSPENSION: Performed by: NURSE PRACTITIONER

## 2021-01-09 PROCEDURE — 99283 EMERGENCY DEPT VISIT LOW MDM: CPT

## 2021-01-09 RX ORDER — DIAPER,BRIEF,INFANT-TODD,DISP
EACH MISCELLANEOUS ONCE
Status: COMPLETED | OUTPATIENT
Start: 2021-01-09 | End: 2021-01-09

## 2021-01-09 RX ORDER — ACETAMINOPHEN 325 MG/1
650 TABLET ORAL ONCE
Status: DISCONTINUED | OUTPATIENT
Start: 2021-01-09 | End: 2021-01-09 | Stop reason: HOSPADM

## 2021-01-09 RX ADMIN — BACITRACIN 1 APPLICATION: 500 OINTMENT TOPICAL at 16:25

## 2021-01-09 RX ADMIN — TETANUS TOXOID, REDUCED DIPHTHERIA TOXOID AND ACELLULAR PERTUSSIS VACCINE, ADSORBED 0.5 ML: 5; 2.5; 8; 8; 2.5 SUSPENSION INTRAMUSCULAR at 16:24

## 2021-01-09 NOTE — ED PROVIDER NOTES
Subjective   Patient presents to the ER with c/o hitting head on the corner of a dish machine while up stairs working [employer]. She states after a few minutes she noticed blood dripping down her neck. She was advised to come here for further evaluation. Denies LOC, dizziness, nausea or vomiting.           Review of Systems   Musculoskeletal: Negative for neck pain.   Neurological: Positive for headaches (tenderness to lac site). Negative for syncope.   Hematological: Does not bruise/bleed easily.       Past Medical History:   Diagnosis Date   • Acid reflux    • Anxiety    • Arthritis    • Depression    • Disease of thyroid gland    • Tuberculosis        Allergies   Allergen Reactions   • Penicillins GI Intolerance       Past Surgical History:   Procedure Laterality Date   • HARDWARE REMOVAL Left 11/6/2017    Procedure: HARDWARE REMOVAL LEFT KNEE                                                  (4-0 cannulated screws two and two free pieces of cerclage wire);  Surgeon: Paul Biggs MD;  Location: [facility];  Service:    • HYSTERECTOMY     • KNEE SURGERY Left     hardware placed secondary to trauma       Family History   Problem Relation Age of Onset   • Heart disease Mother    • Arthritis Mother    • Heart disease Father    • Hypertension Father    • Arthritis Father    • Heart disease Sister    • Hypertension Sister    • Arthritis Sister    • Heart disease Brother    • Hypertension Brother    • Arthritis Brother    • No Known Problems Maternal Grandmother    • No Known Problems Maternal Grandfather    • No Known Problems Paternal Grandmother    • No Known Problems Paternal Grandfather    • Autism Son    • No Known Problems Daughter    • Diabetes Other        Social History     Socioeconomic History   • Marital status:      Spouse name: Not on file   • Number of children: Not on file   • Years of education: Not on file   • Highest education level: Not on file   Tobacco Use   • Smoking status: Former  "Smoker     Packs/day: 1.00     Years: 43.00     Pack years: 43.00     Quit date:      Years since quittin.0   • Smokeless tobacco: Never Used   Substance and Sexual Activity   • Alcohol use: Never     Frequency: Never   • Drug use: No   • Sexual activity: Defer           Objective    /91 (BP Location: Left arm, Patient Position: Sitting)   Pulse 97   Temp 97.3 °F (36.3 °C) (Oral)   Resp 16   Ht 167.6 cm (66\")   Wt 79.4 kg (175 lb)   SpO2 97%   BMI 28.25 kg/m²     Physical Exam  Vitals signs and nursing note reviewed.   Constitutional:       General: She is not in acute distress.     Appearance: She is well-developed. She is not ill-appearing or toxic-appearing.   HENT:      Head: Normocephalic.      Comments: 0.5 cm laceration noted to top right scalp, bleeding controlled, wound has closed on its own. No swelling. Localized tenderness  Neck:      Musculoskeletal: Normal range of motion and neck supple.   Cardiovascular:      Rate and Rhythm: Normal rate and regular rhythm.      Heart sounds: Normal heart sounds. No murmur.   Pulmonary:      Effort: Pulmonary effort is normal. No respiratory distress.      Breath sounds: Normal breath sounds. No wheezing.   Skin:     General: Skin is warm and dry.      Capillary Refill: Capillary refill takes less than 2 seconds.   Neurological:      Mental Status: She is alert and oriented to person, place, and time.      GCS: GCS eye subscore is 4. GCS verbal subscore is 5. GCS motor subscore is 6.      Coordination: Coordination normal.      Gait: Gait is intact.   Psychiatric:         Behavior: Behavior normal.         Thought Content: Thought content normal.         Judgment: Judgment normal.         Procedures           ED Course  ED Course as of  1442   Sat 2021   1647 According to the [place] CT Head Injury/Trauma Rule, patient does not meed criteria for need for CT head. Patient did not have LOC, ha no amnesia to the head injury, no " witnessed disorientation. GCS 15 at this time. Patient is over 16 years old and less than 65, is not on blood thinners, and has no post head injury seizures. There are no suspected skull fractures and no vomiting.     [SH]      ED Course User Index  [SH] Bethany Valdivia APRN                                           [facility]    Final diagnoses:   Injury of head, initial encounter            Bethany Valdivia APRN  01/13/21 1449

## 2021-01-09 NOTE — DISCHARGE INSTRUCTIONS
Keep the area clean and dry for the next 24 hours. May shampoo hair after 24 hours. Return to the ER if you develop any dizziness or headaches that does not resolve with over the counter treatment. May apply ICE to the area as needed for comfort. Follow up with your primary care provider in 3 days for ER follow up and wound recheck.

## 2021-01-11 ENCOUNTER — APPOINTMENT (OUTPATIENT)
Dept: VACCINE CLINIC | Facility: HOSPITAL | Age: 66
End: 2021-01-11

## 2021-01-11 ENCOUNTER — EPISODE CHANGES (OUTPATIENT)
Dept: CASE MANAGEMENT | Facility: OTHER | Age: 66
End: 2021-01-11

## 2021-01-25 ENCOUNTER — IMMUNIZATION (OUTPATIENT)
Dept: VACCINE CLINIC | Facility: HOSPITAL | Age: 66
End: 2021-01-25

## 2021-01-25 PROCEDURE — 91300 HC SARSCOV02 VAC 30MCG/0.3ML IM: CPT | Performed by: THORACIC SURGERY (CARDIOTHORACIC VASCULAR SURGERY)

## 2021-01-25 PROCEDURE — 0002A: CPT | Performed by: THORACIC SURGERY (CARDIOTHORACIC VASCULAR SURGERY)

## 2021-10-18 ENCOUNTER — IMMUNIZATION (OUTPATIENT)
Dept: VACCINE CLINIC | Facility: HOSPITAL | Age: 66
End: 2021-10-18

## 2021-10-18 PROCEDURE — 0004A ADM SARSCOV2 30MCG/0.3ML BOOSTER: CPT | Performed by: SURGERY

## 2021-10-18 PROCEDURE — 91300 HC SARSCOV02 VAC 30MCG/0.3ML IM: CPT | Performed by: SURGERY

## 2022-10-03 ENCOUNTER — IMMUNIZATION (OUTPATIENT)
Dept: VACCINE CLINIC | Facility: HOSPITAL | Age: 67
End: 2022-10-03

## 2022-10-03 DIAGNOSIS — Z23 NEED FOR VACCINATION: Primary | ICD-10-CM

## 2022-10-03 PROCEDURE — 91312 HC SARSCOV2 VAC 30MCG/0.3ML IM BIVALENT BOOSTER 12 YRS AND OLDER: CPT | Performed by: SURGERY

## 2022-10-03 PROCEDURE — 0124A: CPT | Performed by: SURGERY

## 2023-08-13 PROBLEM — J30.2 SEASONAL ALLERGIES: Status: ACTIVE | Noted: 2023-03-21

## 2023-08-13 PROBLEM — M15.3 POST-TRAUMATIC OSTEOARTHRITIS OF MULTIPLE JOINTS: Status: ACTIVE | Noted: 2018-09-12

## 2023-08-13 PROBLEM — F33.1 MODERATE EPISODE OF RECURRENT MAJOR DEPRESSIVE DISORDER: Status: ACTIVE | Noted: 2017-08-29

## 2023-08-16 PROCEDURE — 87635 SARS-COV-2 COVID-19 AMP PRB: CPT | Performed by: NURSE PRACTITIONER
